# Patient Record
Sex: FEMALE | Race: WHITE | NOT HISPANIC OR LATINO | Employment: OTHER | URBAN - METROPOLITAN AREA
[De-identification: names, ages, dates, MRNs, and addresses within clinical notes are randomized per-mention and may not be internally consistent; named-entity substitution may affect disease eponyms.]

---

## 2018-02-23 ENCOUNTER — OFFICE VISIT CONVERTED (OUTPATIENT)
Dept: ORTHOPEDIC SURGERY | Facility: CLINIC | Age: 73
End: 2018-02-23
Attending: PHYSICIAN ASSISTANT

## 2018-03-16 ENCOUNTER — OFFICE VISIT CONVERTED (OUTPATIENT)
Dept: ORTHOPEDIC SURGERY | Facility: CLINIC | Age: 73
End: 2018-03-16
Attending: PHYSICIAN ASSISTANT

## 2018-04-02 ENCOUNTER — OFFICE VISIT CONVERTED (OUTPATIENT)
Dept: ORTHOPEDIC SURGERY | Facility: CLINIC | Age: 73
End: 2018-04-02
Attending: PHYSICIAN ASSISTANT

## 2018-04-30 ENCOUNTER — OFFICE VISIT CONVERTED (OUTPATIENT)
Dept: ORTHOPEDIC SURGERY | Facility: CLINIC | Age: 73
End: 2018-04-30
Attending: PHYSICIAN ASSISTANT

## 2018-06-12 ENCOUNTER — OFFICE VISIT CONVERTED (OUTPATIENT)
Dept: ORTHOPEDIC SURGERY | Facility: CLINIC | Age: 73
End: 2018-06-12
Attending: ORTHOPAEDIC SURGERY

## 2018-07-31 ENCOUNTER — OFFICE VISIT CONVERTED (OUTPATIENT)
Dept: ORTHOPEDIC SURGERY | Facility: CLINIC | Age: 73
End: 2018-07-31
Attending: ORTHOPAEDIC SURGERY

## 2019-05-31 ENCOUNTER — HOSPITAL ENCOUNTER (OUTPATIENT)
Dept: GENERAL RADIOLOGY | Facility: HOSPITAL | Age: 74
Discharge: HOME | End: 2019-05-31

## 2019-07-30 ENCOUNTER — HOSPITAL ENCOUNTER (OUTPATIENT)
Dept: OTHER | Facility: HOSPITAL | Age: 74
Setting detail: RECURRING SERIES
Discharge: HOME | End: 2019-09-26

## 2020-02-14 ENCOUNTER — OFFICE VISIT CONVERTED (OUTPATIENT)
Dept: SURGERY | Facility: CLINIC | Age: 75
End: 2020-02-14
Attending: SURGERY

## 2020-02-14 ENCOUNTER — CONVERSION ENCOUNTER (OUTPATIENT)
Dept: SURGERY | Facility: CLINIC | Age: 75
End: 2020-02-14

## 2020-03-10 ENCOUNTER — HOSPITAL ENCOUNTER (OUTPATIENT)
Dept: GENERAL RADIOLOGY | Facility: HOSPITAL | Age: 75
Discharge: HOME | End: 2020-03-10
Attending: INTERNAL MEDICINE

## 2020-03-18 ENCOUNTER — HOSPITAL ENCOUNTER (OUTPATIENT)
Dept: GASTROENTEROLOGY | Facility: HOSPITAL | Age: 75
Setting detail: HOSPITAL OUTPATIENT SURGERY
Discharge: HOME | End: 2020-03-18
Attending: SURGERY

## 2020-07-06 ENCOUNTER — HOSPITAL ENCOUNTER (OUTPATIENT)
Dept: GENERAL RADIOLOGY | Facility: HOSPITAL | Age: 75
Discharge: HOME | End: 2020-07-06
Attending: INTERNAL MEDICINE

## 2020-07-06 LAB
CREAT BLD-MCNC: 0.7 MG/DL (ref 0.6–1.4)
GFR SERPLBLD BASED ON 1.73 SQ M-ARVRAT: >60 ML/MIN/{1.73_M2}

## 2021-02-02 ENCOUNTER — HOSPITAL ENCOUNTER (OUTPATIENT)
Dept: URGENT CARE | Facility: CLINIC | Age: 76
Discharge: HOME | End: 2021-02-02
Attending: NURSE PRACTITIONER

## 2021-03-16 ENCOUNTER — HOSPITAL ENCOUNTER (OUTPATIENT)
Dept: VACCINE CLINIC | Facility: HOSPITAL | Age: 76
Discharge: HOME | End: 2021-03-16
Attending: INTERNAL MEDICINE

## 2021-04-06 ENCOUNTER — HOSPITAL ENCOUNTER (OUTPATIENT)
Dept: VACCINE CLINIC | Facility: HOSPITAL | Age: 76
Discharge: HOME | End: 2021-04-06
Attending: INTERNAL MEDICINE

## 2021-05-15 VITALS — BODY MASS INDEX: 20.61 KG/M2 | HEIGHT: 68 IN | RESPIRATION RATE: 16 BRPM | WEIGHT: 136 LBS

## 2021-05-16 VITALS — RESPIRATION RATE: 16 BRPM | BODY MASS INDEX: 22.73 KG/M2 | HEIGHT: 68 IN | WEIGHT: 150 LBS

## 2021-05-16 VITALS — WEIGHT: 150 LBS | RESPIRATION RATE: 16 BRPM | BODY MASS INDEX: 22.73 KG/M2 | HEIGHT: 68 IN

## 2021-05-16 VITALS — WEIGHT: 150 LBS | BODY MASS INDEX: 22.73 KG/M2 | HEIGHT: 68 IN | RESPIRATION RATE: 18 BRPM

## 2021-05-16 VITALS — HEIGHT: 68 IN | RESPIRATION RATE: 16 BRPM | WEIGHT: 150 LBS | BODY MASS INDEX: 22.73 KG/M2

## 2021-05-22 ENCOUNTER — TRANSCRIBE ORDERS (OUTPATIENT)
Dept: ADMINISTRATIVE | Facility: HOSPITAL | Age: 76
End: 2021-05-22

## 2021-05-22 DIAGNOSIS — Z12.31 SCREENING MAMMOGRAM, ENCOUNTER FOR: Primary | ICD-10-CM

## 2021-07-29 ENCOUNTER — TREATMENT (OUTPATIENT)
Dept: PHYSICAL THERAPY | Facility: CLINIC | Age: 76
End: 2021-07-29

## 2021-07-29 ENCOUNTER — TRANSCRIBE ORDERS (OUTPATIENT)
Dept: PHYSICAL THERAPY | Facility: CLINIC | Age: 76
End: 2021-07-29

## 2021-07-29 DIAGNOSIS — G89.29 CHRONIC BILATERAL LOW BACK PAIN WITH BILATERAL SCIATICA: Primary | ICD-10-CM

## 2021-07-29 DIAGNOSIS — R26.2 DIFFICULTY WALKING: ICD-10-CM

## 2021-07-29 DIAGNOSIS — M54.42 CHRONIC BILATERAL LOW BACK PAIN WITH BILATERAL SCIATICA: Primary | ICD-10-CM

## 2021-07-29 DIAGNOSIS — M54.50 LOW BACK PAIN, UNSPECIFIED BACK PAIN LATERALITY, UNSPECIFIED CHRONICITY, UNSPECIFIED WHETHER SCIATICA PRESENT: Primary | ICD-10-CM

## 2021-07-29 DIAGNOSIS — M54.41 CHRONIC BILATERAL LOW BACK PAIN WITH BILATERAL SCIATICA: Primary | ICD-10-CM

## 2021-07-29 DIAGNOSIS — R29.3 POOR POSTURE: ICD-10-CM

## 2021-07-29 PROCEDURE — 97161 PT EVAL LOW COMPLEX 20 MIN: CPT | Performed by: PHYSICAL THERAPIST

## 2021-07-29 PROCEDURE — 97110 THERAPEUTIC EXERCISES: CPT | Performed by: PHYSICAL THERAPIST

## 2021-07-29 NOTE — PROGRESS NOTES
Physical Therapy Initial Evaluation and Plan of Care        Patient: Maegan RAMIREZ Post   : 1945  Diagnosis/ICD-10 Code:  No primary diagnosis found.  Referring practitioner: Evert Villarreal MD  Date of Initial Visit: 2021  Today's Date: 2021  Patient seen for Visit count could not be calculated. Make sure you are using a visit which is associated with an episode. sessions           Subjective Questionnaire: Oswestry:       Subjective Evaluation    History of Present Illness  Mechanism of injury: Patient reports that she has had lower back pain back for several years with no VIKASH.  Patient reports that it has gradually gotten worse. Pt states that she has bilateral LE aching pain that occurs during walking and standing activities.  Patient reports that she is walking about a mile but would like to walk 2 miles. Patient states that sitting decreases her pain.   Patient is taking flexoril and tylenol for pain control.     Pain  Current pain ratin  At best pain ratin  At worst pain rating: 10  Quality: dull ache  Aggravating factors: ambulation, lifting, prolonged positioning, standing and movement  Progression: worsening    Treatments  Previous treatment: physical therapy  Patient Goals  Patient goals for therapy: decreased pain, improved balance, increased motion, increased strength, independence with ADLs/IADLs, return to sport/leisure activities and return to work             Objective          Palpation   Left   Tenderness of the erector spinae.     Right Tenderness of the erector spinae.     Tenderness     Lumbar Spine  Tenderness in the spinous process.     Neurological Testing     Sensation     Hip   Left Hip   Intact: light touch    Right Hip   Intact: light touch    Knee   Left Knee   Intact: light touch    Right Knee   Intact: light touch     Ankle/Foot   Left Ankle/Foot   Intact: light touch    Right Ankle/Foot   Intact: light touch     Active Range of Motion     Additional Active  Range of Motion Details  Lumbar AROM: (all motions painful)  Flexion: 50% limited  Extension: 75% limited  Rotation: 50% limited bilaterally  Lateral flexion: 50% limited bilaterally    Strength/Myotome Testing     Left Hip   Planes of Motion   Flexion: 4-  Extension: 4-  Abduction: 4-    Right Hip   Planes of Motion   Flexion: 4-  Extension: 4-  Abduction: 4-    Left Knee   Flexion: 4  Extension: 4    Right Knee   Flexion: 4  Extension: 4    Left Ankle/Foot   Dorsiflexion: 4+    Right Ankle/Foot   Dorsiflexion: 4+    Tests     Lumbar     Left   Positive quadrant.   Negative passive SLR.     Right   Positive quadrant.   Negative passive SLR.     Left Pelvic Girdle/Sacrum   Negative: sacral spring.     Right Pelvic Girdle/Sacrum   Negative: sacral spring.     Additional Tests Details  Lumbar distraction: positive for pain relief     General Comments     Lumbar Comments  Bilateral hamstring tightness noted         Assessment & Plan     Assessment  Impairments: abnormal gait, abnormal muscle firing, abnormal or restricted ROM, activity intolerance, impaired balance, impaired physical strength, lacks appropriate home exercise program, pain with function, safety issue and weight-bearing intolerance  Assessment details: Patient presents with signs/symptoms consistent with lower back pain with bilateral LE radiculopathy including decreased lumbar ROM, bilateral hip and core weakness and reports of pain limiting function during ADLs as shown on the KAL. Patient would benefit from skilled PT services in order to address remaining deficits limiting function at this time.    Prognosis: good  Functional Limitations: walking, uncomfortable because of pain, moving in bed, standing and stooping  Goals  Plan Goals: 1. The patient complains of low back pain.  LTG 1: 12 weeks:  The patient will report a pain rating of 2/10 or better in order to improve  tolerance to activities of daily living and improve sleep quality.  STATUS:   New  STG 1a: 6 weeks:  The patient will report a pain rating of 3/10 or better.  STATUS:  New  TREATMENT:  Therapeutic exercises, manual therapy, aquatic therapy, home exercise   instruction, and modalities as needed for pain to include:  electrical stimulation, moist heat, ice,   ultrasound, and diathermy.      2. The patient demonstrates weakness of the bilateral hip.  LTG 2: 12 weeks:  The patient will demonstrate 4+ /5 strength for bilateral hip flexion, abduction,  and extension in order to improve hip stability.  STATUS:  New  STG 2a: 6 weeks:  The patient will demonstrate 4 /5 strength for bilateral hip flexion, abduction,  and extension.  STATUS:  New  TREATMENT: Therapeutic exercises, manual therapy, aquatic therapy, home exercise instruction,  and modalities as needed for pain to include:  electrical stimulation, moist heat, ice, ultrasound, and   diathermy.      3. The patient has gait dysfunction.  LTG 3: 12 weeks:  The patient will ambulate without assistive device, independently, for   community distances with minimal limp in order to improve mobility and allow   patient to perform activities such as grocery shopping with greater ease.  STATUS:  New  TREATMENT: Gait training, aquatic therapy, therapeutic exercise, and home exercise instruction.    4. Mobility: Walking/Moving Around Functional Limitation    LTG 4: 12 weeks:  The patient will demonstrate 1-19 % limitation by achieving a score of 2/45 on the KAL.  STATUS:  New  TREATMENT:  Manual therapy, therapeutic exercise, home exercise instruction, and modalities as needed to include: moist heat, electrical stimulation, and ultrasound.    Plan  Therapy options: will be seen for skilled physical therapy services  Planned modality interventions: cryotherapy, electrical stimulation/Russian stimulation and TENS  Planned therapy interventions: balance/weight-bearing training, ADL retraining, soft tissue mobilization, strengthening, stretching, therapeutic  activities, joint mobilization, home exercise program, gait training, functional ROM exercises, flexibility, body mechanics training, postural training, neuromuscular re-education, manual therapy and spinal/joint mobilization  Frequency: 2x week  Duration in weeks: 12  Treatment plan discussed with: patient        Timed:         Manual Therapy:    0     mins  77102;     Therapeutic Exercise:    10     mins  41605;     Neuromuscular Farhan:    0    mins  05826;    Therapeutic Activity:     0     mins  44810;     Gait Trainin     mins  45085;     Ultrasound:     0     mins  29748;    Ionto                               0    mins   73801  Self-care  __0__ mins 45702    Un-Timed:  Electrical Stimulation:    0     mins  02258 ( );  Traction     0     mins 31201  Low Eval     35     Mins  81870  Mod Eval     0     Mins  42745  High Eval                       0     Mins  97167    Timed Treatment:   10   mins   Total Treatment:     45   mins    PT SIGNATURE: Zhanna Gutiérrez PT   DATE TREATMENT INITIATED: 2021    Initial Certification  Certification Period: 10/27/2021  I certify that the therapy services are furnished while this patient is under my care.  The services outlined above are required by this patient, and will be reviewed every 90 days.     PHYSICIAN: Evert Villarreal MD      DATE:     Please sign and return via fax to [phone].. Thank you, [facility].

## 2021-08-02 ENCOUNTER — TREATMENT (OUTPATIENT)
Dept: PHYSICAL THERAPY | Facility: CLINIC | Age: 76
End: 2021-08-02

## 2021-08-02 DIAGNOSIS — M54.42 CHRONIC BILATERAL LOW BACK PAIN WITH BILATERAL SCIATICA: Primary | ICD-10-CM

## 2021-08-02 DIAGNOSIS — M54.41 CHRONIC BILATERAL LOW BACK PAIN WITH BILATERAL SCIATICA: Primary | ICD-10-CM

## 2021-08-02 DIAGNOSIS — R29.3 POOR POSTURE: ICD-10-CM

## 2021-08-02 DIAGNOSIS — G89.29 CHRONIC BILATERAL LOW BACK PAIN WITH BILATERAL SCIATICA: Primary | ICD-10-CM

## 2021-08-02 DIAGNOSIS — R26.2 DIFFICULTY WALKING: ICD-10-CM

## 2021-08-02 PROCEDURE — 97140 MANUAL THERAPY 1/> REGIONS: CPT | Performed by: PHYSICAL THERAPIST

## 2021-08-02 PROCEDURE — 97110 THERAPEUTIC EXERCISES: CPT | Performed by: PHYSICAL THERAPIST

## 2021-08-02 NOTE — PROGRESS NOTES
Physical Therapy Daily Progress Note        Patient: Maegan RAMIREZ Post   : 1945  Diagnosis/ICD-10 Code:  Chronic bilateral low back pain with bilateral sciatica [M54.42, M54.41, G89.29]  Referring practitioner: Evert Villarreal MD  Date of Initial Visit: Type: THERAPY  Noted: 2021  Today's Date: 2021  Patient seen for 2 sessions             Subjective   Maegan Post reports: back really bothering her today, states depending on the activity both legs might hurt or just one leg.     Back Pain: 10/10 walking   Back Pain: better when sitting, never 0/10 pain.     Objective   No complaints of increased pain or discomfort.     See Exercise, Manual, and Modality Logs for complete treatment.       Assessment/Plan  Maegan still experiencing increased B hip pain, especially with increased walking. Pt tolerated exercises well, no complaints of increased pain or discomfort. Pt would benefit from skilled PT to address Range of Motion  and Strength deficits, pain management and any concerns with ADLs.     Progress per Plan of Care           Timed:  Manual Therapy:    15     mins  98443;  Therapeutic Exercise:    15     mins  50732;     Neuromuscular Farhan:        mins  44493;    Therapeutic Activity:          mins  36882;     Gait Training:           mins  06093;    Aquatic Therapy:          mins  09696;       Untimed:  Electrical Stimulation:         mins  45484 ( );  Mechanical Traction:         mins  53932;       Timed Treatment:   30   mins   Total Treatment:     30   mins        Ena Turpin PTA  Physical Therapist Assistant

## 2021-08-04 ENCOUNTER — TREATMENT (OUTPATIENT)
Dept: PHYSICAL THERAPY | Facility: CLINIC | Age: 76
End: 2021-08-04

## 2021-08-04 DIAGNOSIS — R26.2 DIFFICULTY WALKING: ICD-10-CM

## 2021-08-04 DIAGNOSIS — M54.41 CHRONIC BILATERAL LOW BACK PAIN WITH BILATERAL SCIATICA: Primary | ICD-10-CM

## 2021-08-04 DIAGNOSIS — M54.42 CHRONIC BILATERAL LOW BACK PAIN WITH BILATERAL SCIATICA: Primary | ICD-10-CM

## 2021-08-04 DIAGNOSIS — R29.3 POOR POSTURE: ICD-10-CM

## 2021-08-04 DIAGNOSIS — G89.29 CHRONIC BILATERAL LOW BACK PAIN WITH BILATERAL SCIATICA: Primary | ICD-10-CM

## 2021-08-04 PROCEDURE — 97140 MANUAL THERAPY 1/> REGIONS: CPT | Performed by: PHYSICAL THERAPIST

## 2021-08-04 PROCEDURE — 97110 THERAPEUTIC EXERCISES: CPT | Performed by: PHYSICAL THERAPIST

## 2021-08-04 NOTE — PROGRESS NOTES
Physical Therapy Daily Progress Note        Patient: Maegan Madera   : 1945  Diagnosis/ICD-10 Code:  Chronic bilateral low back pain with bilateral sciatica [M54.42, M54.41, G89.29]  Referring practitioner: Evert Villarreal MD  Date of Initial Visit: Type: THERAPY  Noted: 2021  Today's Date: 2021  Patient seen for 3 sessions             Subjective   Maegan Post reports: no pain currently but states that she had significantly increased lower back pain while walking this morning.     Objective   PPT activation MMT: 4-/5  See Exercise, Manual, and Modality Logs for complete treatment.       Assessment/Plan  Patient tolerated all exercise progressions and manual therapy well today but continues to demonstrate strength/ROM deficits limiting function. Continue to progress per patient tolerance.    Progress per Plan of Care           Timed:  Manual Therapy:    20     mins  32800;  Therapeutic Exercise:    15     mins  26194;     Neuromuscular Farhan:    0    mins  41684;    Therapeutic Activity:     0     mins  79002;     Gait Trainin     mins  38338;     Ultrasound:     0     mins  41557;    Electrical Stimulation:    0     mins  79775;  Iontophoresis     0     mins  72277    Untimed:  Electrical Stimulation:    0     mins  45173 ( );  Mechanical Traction:    0     mins  94891;   Fluidotherapy     0     mins  76803    Timed Treatment:   35   mins   Total Treatment:     35   mins        Zhanna Gutiérrez PT  Physical Therapist

## 2021-08-11 ENCOUNTER — TREATMENT (OUTPATIENT)
Dept: PHYSICAL THERAPY | Facility: CLINIC | Age: 76
End: 2021-08-11

## 2021-08-11 DIAGNOSIS — M54.42 CHRONIC BILATERAL LOW BACK PAIN WITH BILATERAL SCIATICA: Primary | ICD-10-CM

## 2021-08-11 DIAGNOSIS — G89.29 CHRONIC BILATERAL LOW BACK PAIN WITH BILATERAL SCIATICA: Primary | ICD-10-CM

## 2021-08-11 DIAGNOSIS — R26.2 DIFFICULTY WALKING: ICD-10-CM

## 2021-08-11 DIAGNOSIS — M54.41 CHRONIC BILATERAL LOW BACK PAIN WITH BILATERAL SCIATICA: Primary | ICD-10-CM

## 2021-08-11 DIAGNOSIS — R29.3 POOR POSTURE: ICD-10-CM

## 2021-08-11 PROCEDURE — 97140 MANUAL THERAPY 1/> REGIONS: CPT | Performed by: PHYSICAL THERAPIST

## 2021-08-11 PROCEDURE — 97110 THERAPEUTIC EXERCISES: CPT | Performed by: PHYSICAL THERAPIST

## 2021-08-11 NOTE — PROGRESS NOTES
"Physical Therapy Daily Progress Note        Patient: Maegan Madera   : 1945  Diagnosis/ICD-10 Code:  Chronic bilateral low back pain with bilateral sciatica [M54.42, M54.41, G89.29]  Referring practitioner: Evert Villarreal MD  Date of Initial Visit: Type: THERAPY  Noted: 2021  Today's Date: 2021  Patient seen for 4 sessions             Subjective   Maegan Madera reports having same 10/10 pain in her lower back when standing and walking.  She reports that she walks her dogs 1 mile every morning and states, \"I have to stop and rest because the pain gets too bad\".    She reports getting temporary reduction in pain after therapy, but states that her pain returns once she begins doing functional activities around the house.    Objective     Bilateral Hip Strength - Grossly 4-/5        See Exercise and Manual Logs for complete treatment.       Assessment/Plan  Pt tolerated therapy session well - with progression of therapeutic exercises, CKC-Functional activities, and Manual therapy. She has improved, but continues to have deficits in Trunk and Bilateral Lower Extremtiy ROM,  Strength, and Stability; limiting function and ability to perform ADLs at this time.  Pt reported decrease in pain and tightness post session today.    Progress per Plan of Care           Timed:  Manual Therapy:    13     mins  71443;  Therapeutic Exercise:    25     mins  22557;     Neuromuscular Farhan:    0    mins  65617;    Therapeutic Activity:     0     mins  65079;     Gait Trainin     mins  17451;     Ultrasound:     0     mins  59662;    Electrical Stimulation:    0     mins  33158;  Iontophoresis     0     mins  31032    Untimed:  Electrical Stimulation:    0     mins  49937 ( );  Mechanical Traction:    0     mins  22107;   Fluidotherapy     0     mins  42600  Hot pack     0     mins  06281  Cold pack     0     mins  93772    Timed Treatment:   38   mins   Total Treatment:     38   mins        Babs Davis" PTA  Physical Therapist Assistant

## 2021-08-13 ENCOUNTER — TREATMENT (OUTPATIENT)
Dept: PHYSICAL THERAPY | Facility: CLINIC | Age: 76
End: 2021-08-13

## 2021-08-13 DIAGNOSIS — R26.2 DIFFICULTY WALKING: ICD-10-CM

## 2021-08-13 DIAGNOSIS — M54.42 CHRONIC BILATERAL LOW BACK PAIN WITH BILATERAL SCIATICA: Primary | ICD-10-CM

## 2021-08-13 DIAGNOSIS — M54.41 CHRONIC BILATERAL LOW BACK PAIN WITH BILATERAL SCIATICA: Primary | ICD-10-CM

## 2021-08-13 DIAGNOSIS — G89.29 CHRONIC BILATERAL LOW BACK PAIN WITH BILATERAL SCIATICA: Primary | ICD-10-CM

## 2021-08-13 DIAGNOSIS — R29.3 POOR POSTURE: ICD-10-CM

## 2021-08-13 PROCEDURE — 97140 MANUAL THERAPY 1/> REGIONS: CPT | Performed by: PHYSICAL THERAPIST

## 2021-08-13 PROCEDURE — 97110 THERAPEUTIC EXERCISES: CPT | Performed by: PHYSICAL THERAPIST

## 2021-08-13 NOTE — PROGRESS NOTES
Physical Therapy Daily Progress Note        Patient: Maegan Madera   : 1945  Diagnosis/ICD-10 Code:  Chronic bilateral low back pain with bilateral sciatica [M54.42, M54.41, G89.29]  Referring practitioner: Evert Villarreal MD  Date of Initial Visit: Type: THERAPY  Noted: 2021  Today's Date: 2021  Patient seen for 5 sessions             Subjective   Maegan Madera reports that her back seems to be a little bit better- rating her pain at 7-8/10 upon arrival today.    Objective     Active Range of Motion     Additional Active Range of Motion Details  Lumbar AROM:   Flexion: 25% limited  Extension: 75% limited  Rotation: 50% limited bilaterally  Lateral flexion: 50% limited bilaterally (Pain bilaterally with sidebend L/R       Leg Length - in standing right Lower Extremity Strength appears shorter - in supine Left Lower Extremity appears shorter       See Exercise and Manual Logs for complete treatment.       Assessment/Plan     Pt tolerated therapy session well - with progression of therapeutic exercises, CKC-Functional activities, and Manual therapy. She has improved, but continues to have deficits in Trunk and Bilateral Lower Extremtiy ROM,  Strength, and Stability; limiting function and ability to perform ADLs at this time.  Pt reported decrease in pain and tightness post session today.  Trial of 1/2 inch heel lift in right/Left shoe.  Pt plans to trial at home this weekend with other shoes.    Progress per Plan of Care           Timed:  Manual Therapy:    13     mins  26781;  Therapeutic Exercise:    25     mins  09817;     Neuromuscular Farhan:    0    mins  05678;    Therapeutic Activity:     0     mins  53079;     Gait Trainin     mins  57415;     Ultrasound:     0     mins  66941;    Electrical Stimulation:    0     mins  66105;  Iontophoresis     0     mins  32622    Untimed:  Electrical Stimulation:    0     mins  71184 ( );  Mechanical Traction:    0     mins  14757;    Fluidotherapy     0     mins  20749  Hot pack     0     mins  99279  Cold pack     0     mins  39078    Timed Treatment:   38   mins   Total Treatment:     38   mins        Babs Davis PTA  Physical Therapist Assistant

## 2021-08-17 ENCOUNTER — TREATMENT (OUTPATIENT)
Dept: PHYSICAL THERAPY | Facility: CLINIC | Age: 76
End: 2021-08-17

## 2021-08-17 DIAGNOSIS — M54.41 CHRONIC BILATERAL LOW BACK PAIN WITH BILATERAL SCIATICA: Primary | ICD-10-CM

## 2021-08-17 DIAGNOSIS — R29.3 POOR POSTURE: ICD-10-CM

## 2021-08-17 DIAGNOSIS — G89.29 CHRONIC BILATERAL LOW BACK PAIN WITH BILATERAL SCIATICA: Primary | ICD-10-CM

## 2021-08-17 DIAGNOSIS — M54.42 CHRONIC BILATERAL LOW BACK PAIN WITH BILATERAL SCIATICA: Primary | ICD-10-CM

## 2021-08-17 DIAGNOSIS — R26.2 DIFFICULTY WALKING: ICD-10-CM

## 2021-08-17 PROCEDURE — 97140 MANUAL THERAPY 1/> REGIONS: CPT | Performed by: PHYSICAL THERAPIST

## 2021-08-17 PROCEDURE — 97110 THERAPEUTIC EXERCISES: CPT | Performed by: PHYSICAL THERAPIST

## 2021-08-17 NOTE — PROGRESS NOTES
Physical Therapy Daily Progress Note        Patient: Maegan Madera   : 1945  Diagnosis/ICD-10 Code:  Chronic bilateral low back pain with bilateral sciatica [M54.42, M54.41, G89.29]  Referring practitioner: Evert Villarreal MD  Date of Initial Visit: Type: THERAPY  Noted: 2021  Today's Date: 2021  Patient seen for 6 sessions             Subjective   Maegan Santy reports: 7/10 lower back pain at beginning of session today.    Objective   Bilateral hip abduction MMT; 4-/5  See Exercise, Manual, and Modality Logs for complete treatment.       Assessment/Plan  Patient tolerated all exercise progressions and manual therapy well today but continues to demonstrate strength/ROM deficits limiting function. Continue to progress per patient tolerance.    Progress per Plan of Care           Timed:  Manual Therapy:    15     mins  26780;  Therapeutic Exercise:    15     mins  63079;     Neuromuscular Farhan:    0    mins  13288;    Therapeutic Activity:     0     mins  23475;     Gait Trainin     mins  00066;     Ultrasound:     0     mins  55316;    Electrical Stimulation:    0     mins  23472;  Iontophoresis     0     mins  50726    Untimed:  Electrical Stimulation:    0     mins  89220 ( );  Mechanical Traction:    0     mins  07554;   Fluidotherapy     0     mins  78940  Hot pack     0     Mins    Cold pack                       0     Mins     Timed Treatment:   30   mins   Total Treatment:     30   mins        Zhanna Gutiérrez PT  Physical Therapist

## 2021-08-19 ENCOUNTER — TREATMENT (OUTPATIENT)
Dept: PHYSICAL THERAPY | Facility: CLINIC | Age: 76
End: 2021-08-19

## 2021-08-19 DIAGNOSIS — G89.29 CHRONIC BILATERAL LOW BACK PAIN WITH BILATERAL SCIATICA: Primary | ICD-10-CM

## 2021-08-19 DIAGNOSIS — M54.42 CHRONIC BILATERAL LOW BACK PAIN WITH BILATERAL SCIATICA: Primary | ICD-10-CM

## 2021-08-19 DIAGNOSIS — R26.2 DIFFICULTY WALKING: ICD-10-CM

## 2021-08-19 DIAGNOSIS — R29.3 POOR POSTURE: ICD-10-CM

## 2021-08-19 DIAGNOSIS — M54.41 CHRONIC BILATERAL LOW BACK PAIN WITH BILATERAL SCIATICA: Primary | ICD-10-CM

## 2021-08-19 PROCEDURE — 97110 THERAPEUTIC EXERCISES: CPT | Performed by: PHYSICAL THERAPIST

## 2021-08-19 PROCEDURE — 97140 MANUAL THERAPY 1/> REGIONS: CPT | Performed by: PHYSICAL THERAPIST

## 2021-08-19 NOTE — PROGRESS NOTES
Physical Therapy Daily Progress Note        Patient: Maegan Madera   : 1945  Diagnosis/ICD-10 Code:  Chronic bilateral low back pain with bilateral sciatica [M54.42, M54.41, G89.29]  Referring practitioner: Evert Villarreal MD  Date of Initial Visit: Type: THERAPY  Noted: 2021  Today's Date: 2021  Patient seen for 7 sessions             Subjective   Maegan Madera reports that her left hip and back have been feeling much better.  She rates her discomfort at 2-3/10 upon arrival today.  She continues to report trialing heel lifts in L/R shoe at home intermittently.    Objective     Lower Extremity Strength:  Bilateral Hip Abduction: 4-/5    See Exercise and Manual Logs for complete treatment.       Assessment/Plan     Pt tolerated therapy session well - with progression of therapeutic exercises, CKC-Functional activities, and Manual therapy. She has improved, but continues to have deficits in Trunk and Bilateral Lower Extremtiy ROM,  Strength, and Stability; limiting function and ability to perform ADLs at this time.  Pt reported decrease in pain and tightness post session after Manual therapy and Soft Tissue Mobilization today.    Progress per Plan of Care           Timed:  Manual Therapy:    15     mins  36378;  Therapeutic Exercise:    18     mins  20600;     Neuromuscular Farhan:    0    mins  25751;    Therapeutic Activity:     0     mins  35744;     Gait Trainin     mins  37072;     Ultrasound:     0     mins  05619;    Electrical Stimulation:    0     mins  26352;  Iontophoresis     0     mins  97913    Untimed:  Electrical Stimulation:    0     mins  78959 ( );  Mechanical Traction:    0     mins  28171;   Fluidotherapy     0     mins  43053  Hot pack     0     mins  85247  Cold pack     0     mins  68923    Timed Treatment:   33   mins   Total Treatment:     33   mins        Babs Davis PTA  Physical Therapist Assistant

## 2021-08-26 ENCOUNTER — TREATMENT (OUTPATIENT)
Dept: PHYSICAL THERAPY | Facility: CLINIC | Age: 76
End: 2021-08-26

## 2021-08-26 DIAGNOSIS — G89.29 CHRONIC BILATERAL LOW BACK PAIN WITH BILATERAL SCIATICA: Primary | ICD-10-CM

## 2021-08-26 DIAGNOSIS — R26.2 DIFFICULTY WALKING: ICD-10-CM

## 2021-08-26 DIAGNOSIS — R29.3 POOR POSTURE: ICD-10-CM

## 2021-08-26 DIAGNOSIS — M54.42 CHRONIC BILATERAL LOW BACK PAIN WITH BILATERAL SCIATICA: Primary | ICD-10-CM

## 2021-08-26 DIAGNOSIS — M54.41 CHRONIC BILATERAL LOW BACK PAIN WITH BILATERAL SCIATICA: Primary | ICD-10-CM

## 2021-08-26 PROCEDURE — 97110 THERAPEUTIC EXERCISES: CPT | Performed by: PHYSICAL THERAPIST

## 2021-08-26 PROCEDURE — 97140 MANUAL THERAPY 1/> REGIONS: CPT | Performed by: PHYSICAL THERAPIST

## 2021-08-26 NOTE — PROGRESS NOTES
"Physical Therapy Daily Progress Note        Patient: Maegan Madera   : 1945  Diagnosis/ICD-10 Code:  Chronic bilateral low back pain with bilateral sciatica [M54.42, M54.41, G89.29]  Referring practitioner: Evert Villarreal MD  Date of Initial Visit: Type: THERAPY  Noted: 2021  Today's Date: 2021  Patient seen for 8 sessions             Subjective   Maegan Madera reports that she has continued to feel better since beginning therapy- reporting decrease in pain with increase in functional mobility.  She  Rates her lower back pain at 2/10 upon arrival.  She does continue to report having \"Sciatica\" - stating, \"I have had that for years\".    Objective     Lower Extremity Strength:    Bilateral Hip Abduction:    Right:  4/5  Left:  4-/5        See Exercise and Manual Logs for complete treatment.       Assessment/Plan     Pt tolerated therapy session well - with progression of therapeutic exercises, CKC-Functional activities, and Manual therapy. She has improved, but continues to have deficits in Trunk and Bilateral Lower Extremtiy ROM,  Strength, and Stability; limiting function and ability to perform ADLs at this time.  Pt reports decrease in pain and tightness post sessions after Manual therapy and Soft Tissue Mobilization past few visits.    Progress per Plan of Care           Timed:  Manual Therapy:    13     mins  77214;  Therapeutic Exercise:    25     mins  16465;     Neuromuscular Farhan:    0    mins  50391;    Therapeutic Activity:     0     mins  98732;     Gait Trainin     mins  64829;     Ultrasound:     0     mins  11924;    Electrical Stimulation:    0     mins  15209;  Iontophoresis     0     mins  31178    Untimed:  Electrical Stimulation:    0     mins  46586 ( );  Mechanical Traction:    0     mins  02027;   Fluidotherapy     0     mins  12261  Hot pack     0     mins  03762  Cold pack     0     mins  33056    Timed Treatment:   38   mins   Total Treatment:     38   " cierra Davis PTA  Physical Therapist Assistant

## 2021-08-30 ENCOUNTER — TREATMENT (OUTPATIENT)
Dept: PHYSICAL THERAPY | Facility: CLINIC | Age: 76
End: 2021-08-30

## 2021-08-30 DIAGNOSIS — M54.41 CHRONIC BILATERAL LOW BACK PAIN WITH BILATERAL SCIATICA: Primary | ICD-10-CM

## 2021-08-30 DIAGNOSIS — R29.3 POOR POSTURE: ICD-10-CM

## 2021-08-30 DIAGNOSIS — G89.29 CHRONIC BILATERAL LOW BACK PAIN WITH BILATERAL SCIATICA: Primary | ICD-10-CM

## 2021-08-30 DIAGNOSIS — M54.42 CHRONIC BILATERAL LOW BACK PAIN WITH BILATERAL SCIATICA: Primary | ICD-10-CM

## 2021-08-30 DIAGNOSIS — R26.2 DIFFICULTY WALKING: ICD-10-CM

## 2021-08-30 PROCEDURE — 97140 MANUAL THERAPY 1/> REGIONS: CPT | Performed by: PHYSICAL THERAPIST

## 2021-08-30 PROCEDURE — 97110 THERAPEUTIC EXERCISES: CPT | Performed by: PHYSICAL THERAPIST

## 2021-08-30 NOTE — PROGRESS NOTES
Progress note    Patient: Maegan RAMIREZ Post   : 1945  Diagnosis/ICD-10 Code:  Chronic bilateral low back pain with bilateral sciatica [M54.42, M54.41, G89.29]  Referring practitioner: vEert Villarreal MD  Date of Initial Visit: Type: THERAPY  Noted: 2021  Today's Date: 2021  Patient seen for 9 sessions    Progress note due: 2021  Re-cert due: 2021      Subjective:   Maegan Madera reports: that lower back/ LLE pain has gotten significantly better since starting therapy. Patient reports that she can now walk further but is still having intermittent L LE radicular pain and L buttock/lumbar pain which she rates as a 5/10 at worst.  Subjective Questionnaire: LEFS:   Clinical Progress: improved  Home Program Compliance: Yes  Treatment has included: therapeutic exercise, manual therapy and moist heat    Subjective   Objective          Strength/Myotome Testing     Left Hip   Planes of Motion   Flexion: 4  Extension: 4-  Abduction: 4-    Right Hip   Planes of Motion   Flexion: 4  Extension: 4-  Abduction: 4-    Left Knee   Flexion: 4  Extension: 4    Right Knee   Flexion: 4  Extension: 4    Left Ankle/Foot   Dorsiflexion: 4+    Right Ankle/Foot   Dorsiflexion: 4+     General Comments     Lumbar Comments  Lumbar AROM: 25% limitation  All directions      Assessment & Plan     Assessment  Assessment details: Patient demonstrates improvements in lumbar AROM and reports of pain but still with lumbar ROM deficits, bilateral hip and core weakness and reports of pain limiting function as shown on the KAL. Patient would continue to benefit from skilled PT services in order to address remaining deficits limiting function at this time.     Goals  Plan Goals: 1. The patient complains of low back pain.  LTG 1: 12 weeks:  The patient will report a pain rating of 2/10 or better in order to improve  tolerance to activities of daily living and improve sleep quality.  STATUS:  Ongoing  STG 1a: 6 weeks:  The patient  will report a pain rating of 3/10 or better.  STATUS:  Ongoing  TREATMENT:  Therapeutic exercises, manual therapy, aquatic therapy, home exercise   instruction, and modalities as needed for pain to include:  electrical stimulation, moist heat, ice,   ultrasound, and diathermy.      2. The patient demonstrates weakness of the bilateral hip.  LTG 2: 12 weeks:  The patient will demonstrate 4+ /5 strength for bilateral hip flexion, abduction,  and extension in order to improve hip stability.  STATUS:  Ongoing  STG 2a: 6 weeks:  The patient will demonstrate 4 /5 strength for bilateral hip flexion, abduction,  and extension.  STATUS:  Ongoing  TREATMENT: Therapeutic exercises, manual therapy, aquatic therapy, home exercise instruction,  and modalities as needed for pain to include:  electrical stimulation, moist heat, ice, ultrasound, and   diathermy.      3. The patient has gait dysfunction.  LTG 3: 12 weeks:  The patient will ambulate without assistive device, independently, for   community distances with minimal limp in order to improve mobility and allow   patient to perform activities such as grocery shopping with greater ease.  STATUS:  Ongoing  TREATMENT: Gait training, aquatic therapy, therapeutic exercise, and home exercise instruction.    4. Mobility: Walking/Moving Around Functional Limitation                   LTG 4: 12 weeks:  The patient will demonstrate 1-19 % limitation by achieving a score of 2/45 on the KAL.  STATUS:  Ongoing  TREATMENT:  Manual therapy, therapeutic exercise, home exercise instruction, and modalities as needed to include: moist heat, electrical stimulation, and ultrasound.    Plan  Frequency: 2x week  Duration in weeks: 8      Progress toward previous goals: Partially Met        Recommendations: Continue as planned  Timeframe: 2 months  Prognosis to achieve goals: good    PT SIGNATURE: Zhanna Gutiérrez PT   DATE TREATMENT INITIATED: 8/30/2021  Certification Period: 8/30/2021 -  2021      Based upon review of the patient's progress and continued therapy plan, it is my medical opinion that Maegan Madera should continue physical therapy treatment at [facility]  [address] [place] [address] [address] [place] [id number] [phone].    Signature: __________________________________  Evert Villarreal MD    Timed:  Manual Therapy:    12     mins  60253;  Therapeutic Exercise:    18     mins  78462;     Neuromuscular Farhan:    0    mins  86115;    Therapeutic Activity:     0     mins  57106;     Gait Trainin     mins  52347;     Ultrasound:     0     mins  06298;    Electrical Stimulation:    0     mins  34377 ( );    Untimed:  Electrical Stimulation:    0     mins  92910 ( );  Mechanical Traction:    0     mins  04989;     Timed Treatment:   30   mins   Total Treatment:     35   mins

## 2021-09-02 ENCOUNTER — TREATMENT (OUTPATIENT)
Dept: PHYSICAL THERAPY | Facility: CLINIC | Age: 76
End: 2021-09-02

## 2021-09-02 DIAGNOSIS — R29.3 POOR POSTURE: ICD-10-CM

## 2021-09-02 DIAGNOSIS — M54.42 CHRONIC BILATERAL LOW BACK PAIN WITH BILATERAL SCIATICA: Primary | ICD-10-CM

## 2021-09-02 DIAGNOSIS — G89.29 CHRONIC BILATERAL LOW BACK PAIN WITH BILATERAL SCIATICA: Primary | ICD-10-CM

## 2021-09-02 DIAGNOSIS — R26.2 DIFFICULTY WALKING: ICD-10-CM

## 2021-09-02 DIAGNOSIS — M54.41 CHRONIC BILATERAL LOW BACK PAIN WITH BILATERAL SCIATICA: Primary | ICD-10-CM

## 2021-09-02 PROCEDURE — 97110 THERAPEUTIC EXERCISES: CPT | Performed by: PHYSICAL THERAPIST

## 2021-09-02 PROCEDURE — 97140 MANUAL THERAPY 1/> REGIONS: CPT | Performed by: PHYSICAL THERAPIST

## 2021-09-02 NOTE — PROGRESS NOTES
"Physical Therapy Daily Progress Note        Patient: Maegan Madera   : 1945  Diagnosis/ICD-10 Code:  Chronic bilateral low back pain with bilateral sciatica [M54.42, M54.41, G89.29]  Referring practitioner: Evert Villarreal MD  Date of Initial Visit: Type: THERAPY  Noted: 2021  Today's Date: 2021  Patient seen for 10 sessions             Subjective   Maegan Madera reports overall feeling better.  She denies having any pain, but does report increased discomfort if she stands or walks more than 5-10 min and states, \"It gets progressively worse - the longer I go\".    Objective     Strength/Myotome Testing      Left Hip   Planes of Motion   Flexion: 4/5  Extension: 4-/5  Abduction: 4-/5     Right Hip   Planes of Motion   Flexion: 4/5  Extension: 4-/5  Abduction: 4-/5      See Exercise, Manual, and Modality Logs for complete treatment.       Assessment/Plan    Pt tolerated therapy session well - with progression of therapeutic exercises, CKC-Functional activities, and Manual therapy. She has improved, but continues to have deficits in Trunk and Bilateral Lower Extremtiy ROM,  Strength, and Stability; limiting function and ability to perform ADLs at this time.  Pt reports decrease in pain and tightness post sessions after Manual therapy and Soft tissue mobilization.     Progress per Plan of Care           Timed:  Manual Therapy:    15     mins  32497;  Therapeutic Exercise:    23     mins  50923;     Neuromuscular Farhan:    0    mins  97145;    Therapeutic Activity:     0     mins  63989;     Gait Trainin     mins  98507;     Ultrasound:     0     mins  87771;    Electrical Stimulation:    0     mins  54857;  Iontophoresis     0     mins  70148    Untimed:  Electrical Stimulation:    0     mins  76121 ( );  Mechanical Traction:    0     mins  33244;   Fluidotherapy     0     mins  73599  Hot pack     0     mins  19692  Cold pack     0     mins  43325    Timed Treatment:   38   mins   Total " Treatment:     38   mins        Babs Davis PTA  Physical Therapist Assistant

## 2021-09-08 ENCOUNTER — TREATMENT (OUTPATIENT)
Dept: PHYSICAL THERAPY | Facility: CLINIC | Age: 76
End: 2021-09-08

## 2021-09-08 DIAGNOSIS — G89.29 CHRONIC BILATERAL LOW BACK PAIN WITH BILATERAL SCIATICA: Primary | ICD-10-CM

## 2021-09-08 DIAGNOSIS — M54.41 CHRONIC BILATERAL LOW BACK PAIN WITH BILATERAL SCIATICA: Primary | ICD-10-CM

## 2021-09-08 DIAGNOSIS — M54.42 CHRONIC BILATERAL LOW BACK PAIN WITH BILATERAL SCIATICA: Primary | ICD-10-CM

## 2021-09-08 DIAGNOSIS — R29.3 POOR POSTURE: ICD-10-CM

## 2021-09-08 DIAGNOSIS — R26.2 DIFFICULTY WALKING: ICD-10-CM

## 2021-09-08 PROCEDURE — 97110 THERAPEUTIC EXERCISES: CPT | Performed by: PHYSICAL THERAPIST

## 2021-09-08 PROCEDURE — 97140 MANUAL THERAPY 1/> REGIONS: CPT | Performed by: PHYSICAL THERAPIST

## 2021-09-08 NOTE — PROGRESS NOTES
Physical Therapy Daily Progress Note        Patient: Maegan Madera   : 1945  Diagnosis/ICD-10 Code:  Chronic bilateral low back pain with bilateral sciatica [M54.42, M54.41, G89.29]  Referring practitioner: Evert Villarreal MD  Date of Initial Visit: Type: THERAPY  Noted: 2021  Today's Date: 2021  Patient seen for 11 sessions             Subjective   Maegan Madera denies having any pain in lower back upon  Arrival today.  She states that she continues to feel better overall.  Objective     Bilateral Hip Abductor Strength:  Bilaterally 4-/5        See Exercise Logs for complete treatment.       Assessment/Plan   Pt tolerated therapy session well - with progression of therapeutic exercises, CKC-Functional activities, and Manual therapy. She has improved, but continues to have deficits in Trunk and Bilateral Lower Extremtiy ROM,  Strength, and Stability; limiting function and ability to perform ADLs at this time.  Pt reports decrease in pain and tightness post sessions after Manual therapy and Soft tissue mobilization.       Progress per Plan of Care           Timed:  Manual Therapy:    15     mins  18092;  Therapeutic Exercise:    23     mins  93017;     Neuromuscular Farhan:    0    mins  23673;    Therapeutic Activity:     0     mins  27823;     Gait Trainin     mins  68461;     Ultrasound:     0     mins  91788;    Electrical Stimulation:    0     mins  25699;  Iontophoresis     0     mins  93259    Untimed:  Electrical Stimulation:    0     mins  78642 ( );  Mechanical Traction:    0     mins  21199;   Fluidotherapy     0     mins  45646  Hot pack     0     mins  09121  Cold pack     0     mins  94586     Timed Treatment:   38   mins   Total Treatment:     38   mins        Babs Davis PTA  Physical Therapist Assistant

## 2021-09-14 ENCOUNTER — TREATMENT (OUTPATIENT)
Dept: PHYSICAL THERAPY | Facility: CLINIC | Age: 76
End: 2021-09-14

## 2021-09-14 DIAGNOSIS — R26.2 DIFFICULTY WALKING: ICD-10-CM

## 2021-09-14 DIAGNOSIS — M54.42 CHRONIC BILATERAL LOW BACK PAIN WITH BILATERAL SCIATICA: Primary | ICD-10-CM

## 2021-09-14 DIAGNOSIS — G89.29 CHRONIC BILATERAL LOW BACK PAIN WITH BILATERAL SCIATICA: Primary | ICD-10-CM

## 2021-09-14 DIAGNOSIS — R29.3 POOR POSTURE: ICD-10-CM

## 2021-09-14 DIAGNOSIS — M54.41 CHRONIC BILATERAL LOW BACK PAIN WITH BILATERAL SCIATICA: Primary | ICD-10-CM

## 2021-09-14 PROCEDURE — 97140 MANUAL THERAPY 1/> REGIONS: CPT | Performed by: PHYSICAL THERAPIST

## 2021-09-14 PROCEDURE — 97110 THERAPEUTIC EXERCISES: CPT | Performed by: PHYSICAL THERAPIST

## 2021-09-14 NOTE — PROGRESS NOTES
Physical Therapy Daily Progress Note        Patient: Maegan Madera   : 1945  Diagnosis/ICD-10 Code:  Chronic bilateral low back pain with bilateral sciatica [M54.42, M54.41, G89.29]  Referring practitioner: Evert Villarreal MD  Date of Initial Visit: Type: THERAPY  Noted: 2021  Today's Date: 2021  Patient seen for 12 sessions             Subjective   Maegan Madera reports: 2/10 lower back pain at beginning of session today.     Objective   Bilateral hip abduction: 4-/5  See Exercise, Manual, and Modality Logs for complete treatment.       Assessment/Plan  Patient tolerated all exercise progressions and manual therapy well today but continues to demonstrate strength/ROM deficits limiting function. Continue to progress per patient tolerance.    Progress per Plan of Care           Timed:  Manual Therapy:    15     mins  82101;  Therapeutic Exercise:    23     mins  65393;     Neuromuscular Farhan:    0    mins  01772;    Therapeutic Activity:     0     mins  33265;     Gait Trainin     mins  17425;     Ultrasound:     0     mins  39552;    Electrical Stimulation:    0     mins  76431;  Iontophoresis     0     mins  57421    Untimed:  Electrical Stimulation:    0     mins  87373 ( );  Mechanical Traction:    0     mins  66393;   Fluidotherapy     0     mins  87225    Timed Treatment:   38   mins   Total Treatment:     38   mins        Zhanna Gutiérrez PT  Physical Therapist

## 2021-09-17 ENCOUNTER — TREATMENT (OUTPATIENT)
Dept: PHYSICAL THERAPY | Facility: CLINIC | Age: 76
End: 2021-09-17

## 2021-09-17 DIAGNOSIS — R26.2 DIFFICULTY WALKING: ICD-10-CM

## 2021-09-17 DIAGNOSIS — M54.42 CHRONIC BILATERAL LOW BACK PAIN WITH BILATERAL SCIATICA: Primary | ICD-10-CM

## 2021-09-17 DIAGNOSIS — G89.29 CHRONIC BILATERAL LOW BACK PAIN WITH BILATERAL SCIATICA: Primary | ICD-10-CM

## 2021-09-17 DIAGNOSIS — R29.3 POOR POSTURE: ICD-10-CM

## 2021-09-17 DIAGNOSIS — M54.41 CHRONIC BILATERAL LOW BACK PAIN WITH BILATERAL SCIATICA: Primary | ICD-10-CM

## 2021-09-17 PROCEDURE — 97110 THERAPEUTIC EXERCISES: CPT | Performed by: PHYSICAL THERAPIST

## 2021-09-17 PROCEDURE — 97140 MANUAL THERAPY 1/> REGIONS: CPT | Performed by: PHYSICAL THERAPIST

## 2021-09-17 NOTE — PROGRESS NOTES
Physical Therapy Daily Progress Note        Patient: Maegan Madera   : 1945  Diagnosis/ICD-10 Code:  Chronic bilateral low back pain with bilateral sciatica [M54.42, M54.41, G89.29]  Referring practitioner: Evert Villarreal MD  Date of Initial Visit: Type: THERAPY  Noted: 2021  Today's Date: 2021  Patient seen for 13 sessions             Subjective   Maegan Madera reports: 2/10 lower back pain today.    Objective   Bilateral hip abduction MMT: 4-/5  See Exercise, Manual, and Modality Logs for complete treatment.       Assessment/Plan  Patient tolerated all exercise progressions and manual therapy well today but continues to demonstrate strength/ROM deficits limiting function. Continue to progress per patient tolerance.    Progress per Plan of Care           Timed:  Manual Therapy:    15     mins  22474;  Therapeutic Exercise:    20     mins  36627;     Neuromuscular Farhan:    0    mins  61876;    Therapeutic Activity:     0     mins  39349;     Gait Trainin     mins  55508;     Ultrasound:     0     mins  07564;    Electrical Stimulation:    0     mins  76791;  Iontophoresis     0     mins  36831    Untimed:  Electrical Stimulation:    0     mins  82402 ( );  Mechanical Traction:    0     mins  21020;   Fluidotherapy     0     mins  16992  Hot pack     0     Mins    Cold pack                       0     Mins     Timed Treatment:   35   mins   Total Treatment:     35   mins        Zhanna Gutiérrez PT  Physical Therapist

## 2021-09-21 ENCOUNTER — TREATMENT (OUTPATIENT)
Dept: PHYSICAL THERAPY | Facility: CLINIC | Age: 76
End: 2021-09-21

## 2021-09-21 DIAGNOSIS — R29.3 POOR POSTURE: ICD-10-CM

## 2021-09-21 DIAGNOSIS — R26.2 DIFFICULTY WALKING: ICD-10-CM

## 2021-09-21 DIAGNOSIS — M54.41 CHRONIC BILATERAL LOW BACK PAIN WITH BILATERAL SCIATICA: Primary | ICD-10-CM

## 2021-09-21 DIAGNOSIS — G89.29 CHRONIC BILATERAL LOW BACK PAIN WITH BILATERAL SCIATICA: Primary | ICD-10-CM

## 2021-09-21 DIAGNOSIS — M54.42 CHRONIC BILATERAL LOW BACK PAIN WITH BILATERAL SCIATICA: Primary | ICD-10-CM

## 2021-09-21 PROCEDURE — 97140 MANUAL THERAPY 1/> REGIONS: CPT | Performed by: PHYSICAL THERAPIST

## 2021-09-21 PROCEDURE — 97110 THERAPEUTIC EXERCISES: CPT | Performed by: PHYSICAL THERAPIST

## 2021-09-21 NOTE — PROGRESS NOTES
Discharge note    Patient: Maegan RAMIREZ Post   : 1945  Diagnosis/ICD-10 Code:  Chronic bilateral low back pain with bilateral sciatica [M54.42, M54.41, G89.29]  Referring practitioner: Evert Villarreal MD  Date of Initial Visit: Type: THERAPY  Noted: 2021  Today's Date: 2021  Patient seen for 14 sessions    Progress note due: 10/21/2021  Re-cert due: 2021      Subjective:   Maegan Post reports: 2/10 lower back pain at beginning of session today.  Patient reports that due to improvements in pain and function she would like to be discharged from skilled PT services and continue with HEP.   Subjective Questionnaire: Oswestry:   Clinical Progress: improved  Home Program Compliance: Yes  Treatment has included: therapeutic exercise and manual therapy    Subjective   Objective   Strength/Myotome Testing      Left Hip   Planes of Motion   Flexion: 4+  Extension: 4  Abduction: 4     Right Hip   Planes of Motion   Flexion: 4+  Extension: 4  Abduction: 4     Left Knee   Flexion: 4+  Extension: 4+     Right Knee   Flexion: 4+  Extension: 4+     Left Ankle/Foot   Dorsiflexion: 4+     Right Ankle/Foot   Dorsiflexion: 4+      General Comments      Lumbar Comments  Lumbar AROM: 25% limitation  All directions   Assessment & Plan     Assessment  Assessment details: Patient demonstrates improvements in overall pain and function during ADLs as well as bilateral hip and core strength improvements.  Due to pain and functional improvements the patient wishes to be discharged from skilled PT services at this time and will continue HEP. Pt will be discharged per her wishes.     Goals  Plan Goals: 1. The patient complains of low back pain.  LTG 1: 12 weeks:  The patient will report a pain rating of 2/10 or better in order to improve  tolerance to activities of daily living and improve sleep quality.  STATUS:  MET  STG 1a: 6 weeks:  The patient will report a pain rating of 3/10 or better.  STATUS:  MET  TREATMENT:   Therapeutic exercises, manual therapy, aquatic therapy, home exercise   instruction, and modalities as needed for pain to include:  electrical stimulation, moist heat, ice,   ultrasound, and diathermy.      2. The patient demonstrates weakness of the bilateral hip.  LTG 2: 12 weeks:  The patient will demonstrate 4+ /5 strength for bilateral hip flexion, abduction,  and extension in order to improve hip stability.  STATUS:  Partially met, continue  STG 2a: 6 weeks:  The patient will demonstrate 4 /5 strength for bilateral hip flexion, abduction,  and extension.  STATUS:  MET  TREATMENT: Therapeutic exercises, manual therapy, aquatic therapy, home exercise instruction,  and modalities as needed for pain to include:  electrical stimulation, moist heat, ice, ultrasound, and   diathermy.      3. The patient has gait dysfunction.  LTG 3: 12 weeks:  The patient will ambulate without assistive device, independently, for   community distances with minimal limp in order to improve mobility and allow   patient to perform activities such as grocery shopping with greater ease.  STATUS:  MET  TREATMENT: Gait training, aquatic therapy, therapeutic exercise, and home exercise instruction.    4. Mobility: Walking/Moving Around Functional Limitation                   LTG 4: 12 weeks:  The patient will demonstrate 1-19 % limitation by achieving a score of 2/45 on the KAL.  STATUS:  Not met, continue  TREATMENT:  Manual therapy, therapeutic exercise, home exercise instruction, and modalities as needed to include: moist heat, electrical stimulation, and ultrasound.      Progress toward previous goals: Partially Met        Recommendations: Discharge    PT SIGNATURE: Zhanna Gutiérrez, KEVIN   DATE TREATMENT INITIATED: 9/21/2021  Certification Period: 9/21/2021 - 12/20/2021      Based upon review of the patient's progress and continued therapy plan, it is my medical opinion that Maegan Madera should continue physical therapy treatment at [facility]  [facility]  [address]  [place] [address]  [phone].    Signature: __________________________________  Evert Villarreal MD    Timed:  Manual Therapy:    15     mins  57630;  Therapeutic Exercise:    15     mins  65846;     Neuromuscular Farhan:    0    mins  49380;    Therapeutic Activity:     0     mins  12875;     Gait Trainin     mins  05729;     Ultrasound:     0     mins  67818;    Electrical Stimulation:    0     mins  00393 ( );    Untimed:  Electrical Stimulation:    0     mins  37529 ( );  Mechanical Traction:    0     mins  64410;     Timed Treatment:   30   mins   Total Treatment:     35   mins

## 2021-09-22 ENCOUNTER — HOSPITAL ENCOUNTER (OUTPATIENT)
Dept: MAMMOGRAPHY | Facility: HOSPITAL | Age: 76
Discharge: HOME | End: 2021-09-22
Admitting: INTERNAL MEDICINE

## 2021-09-22 DIAGNOSIS — Z12.31 SCREENING MAMMOGRAM, ENCOUNTER FOR: ICD-10-CM

## 2021-09-22 PROCEDURE — 77067 SCR MAMMO BI INCL CAD: CPT

## 2021-09-22 PROCEDURE — 77063 BREAST TOMOSYNTHESIS BI: CPT

## 2021-11-12 ENCOUNTER — TRANSCRIBE ORDERS (OUTPATIENT)
Dept: PHYSICAL THERAPY | Facility: CLINIC | Age: 76
End: 2021-11-12

## 2021-11-12 DIAGNOSIS — M54.40 LOW BACK PAIN WITH SCIATICA, SCIATICA LATERALITY UNSPECIFIED, UNSPECIFIED BACK PAIN LATERALITY, UNSPECIFIED CHRONICITY: Primary | ICD-10-CM

## 2021-12-02 ENCOUNTER — TREATMENT (OUTPATIENT)
Dept: PHYSICAL THERAPY | Facility: CLINIC | Age: 76
End: 2021-12-02

## 2021-12-02 DIAGNOSIS — G89.29 CHRONIC BILATERAL LOW BACK PAIN WITH LEFT-SIDED SCIATICA: Primary | ICD-10-CM

## 2021-12-02 DIAGNOSIS — M25.552 LEFT HIP PAIN: ICD-10-CM

## 2021-12-02 DIAGNOSIS — R26.2 DIFFICULTY WALKING: ICD-10-CM

## 2021-12-02 DIAGNOSIS — M54.42 CHRONIC BILATERAL LOW BACK PAIN WITH LEFT-SIDED SCIATICA: Primary | ICD-10-CM

## 2021-12-02 PROCEDURE — 97110 THERAPEUTIC EXERCISES: CPT | Performed by: PHYSICAL THERAPIST

## 2021-12-02 PROCEDURE — 97161 PT EVAL LOW COMPLEX 20 MIN: CPT | Performed by: PHYSICAL THERAPIST

## 2021-12-02 NOTE — PROGRESS NOTES
Physical Therapy Initial Evaluation and Plan of Care    Patient: Maegan RAMIREZ Post   : 1945  Diagnosis/ICD-10 Code:  Chronic bilateral low back pain with left-sided sciatica [M54.42, G89.29]  Referring practitioner: Evert Villarreal MD  Date of Initial Visit: 2021  Today's Date: 2021  Patient seen for 1 sessions           Subjective Questionnaire: Oswestry: 15/45      Subjective Evaluation    History of Present Illness  Mechanism of injury: Patient reports that she has had intermittent lower back pain for several years with no VIKASH.  Patient reports that it has gradually gotten worse.  Pt reports that 4 weeks ago her sciatic nerve pain flared up with no VIKASH.  Pt reports that pain is only going down to her L knee.  Pt reports that prolonged walking, standing increases her pain. Patient is taking flexoril and tylenol for pain control.  Pt reports intermittent L LE buckling.     Pain  Current pain ratin  At best pain ratin  At worst pain rating: 10  Quality: dull ache and discomfort  Aggravating factors: ambulation and standing    Social Support  Lives in: multiple-level home    Treatments  Previous treatment: physical therapy  Patient Goals  Patient goals for therapy: decreased pain, increased motion, increased strength, independence with ADLs/IADLs and return to sport/leisure activities           Objective          Static Posture   General Observations  Shifted right.     Head  Forward.    Shoulders  Rounded.    Scapulae  Left protracted and right protracted.    Thoracic Spine  Hyperkyphosis.    Lumbar Spine   Decreased lordosis.     Palpation   Left   Tenderness of the erector spinae.     Right Tenderness of the erector spinae.     Tenderness     Additional Tenderness Details  Lumbar paraspinal tenderness and tightness noted, bilateral piriformis and hamstring tightness noted    Active Range of Motion     Additional Active Range of Motion Details  Lumbar AROM: all motions painful  Flexion: 25%  limited (pain)  Extension: 50% limited (pain)  Lateral flexion: 50% limited (pain)  Rotation: 50% limited (pain)    Strength/Myotome Testing     Left Hip   Planes of Motion   Flexion: 4-  Extension: 4-  Abduction: 4-    Right Hip   Planes of Motion   Flexion: 4  Extension: 4-  Abduction: 4-    Left Knee   Flexion: 4-  Extension: 4-  Quadriceps contraction: fair    Right Knee   Flexion: 4+  Extension: 4+    Left Ankle/Foot   Dorsiflexion: 4+    Right Ankle/Foot   Dorsiflexion: 4+    Tests     Lumbar     Left   Positive passive SLR and quadrant.     Right   Positive quadrant.   Negative passive SLR.     Left Hip   Positive DARIEN, piriformis and scour.   90/90 SLR: Positive.   SLR: Negative.     Ambulation     Observational Gait   Gait: antalgic   Decreased walking speed, left stance time and left step length.   Left foot contact pattern: foot flat          Assessment & Plan     Assessment  Impairments: abnormal gait, abnormal muscle firing, abnormal or restricted ROM, activity intolerance, impaired balance, impaired physical strength, lacks appropriate home exercise program, pain with function, safety issue and weight-bearing intolerance  Functional Limitations: sleeping, walking, uncomfortable because of pain, moving in bed, sitting, standing and stooping  Assessment details: Patient presents with signs/symptoms consistent with lower back pain with LLE referred pain and left hip pain including decreased lumbar/left hip ROM, bilateral hip and core weakness and reports of pain limiting function during ADLs as shown on the KAL. Patient would benefit from skilled PT services in order to address remaining deficits limiting function at this time.       Prognosis: good    Goals  Plan Goals: 1. The patient complains of low back/L hip pain.  LTG 1: 12 weeks:  The patient will report a pain rating of 2 or better in order to improve  tolerance to activities of daily living and improve sleep quality.  STATUS:  New  STG 1a: 6  weeks:  The patient will report a pain rating of 3 or better.  STATUS:  New  TREATMENT:  Therapeutic exercises, manual therapy, aquatic therapy, home exercise   instruction, and modalities as needed for pain to include:  electrical stimulation, moist heat, ice,   ultrasound, and diathermy.      2. The patient demonstrates weakness of the bilateral hips.  LTG 2: 12 weeks:  The patient will demonstrate 4+ /5 strength for bilateral hip flexion, abduction,  and extension in order to improve hip stability.  STATUS:  New  STG 2a: 6 weeks:  The patient will demonstrate 4 /5 strength for bilateral hip flexion, abduction,  and extension.  STATUS:  New  TREATMENT: Therapeutic exercises, manual therapy, aquatic therapy, home exercise instruction,  and modalities as needed for pain to include:  electrical stimulation, moist heat, ice, ultrasound, and   diathermy.      3. The patient has gait dysfunction.  LTG 3: 12 weeks:  The patient will ambulate without assistive device, independently, for   community distances with minimal limp to the L lower extremity in order to improve mobility and allow   patient to perform activities such as grocery shopping with greater ease.  STATUS:  New  TREATMENT: Gait training, aquatic therapy, therapeutic exercise, and home exercise instruction.    3. Mobility: Walking/Moving Around Functional Limitation    LTG 3: 12 weeks:  The patient will demonstrate 1-19 % limitation by achieving a score of 5/45 on the KAL.  STATUS:  New  TREATMENT:  Manual therapy, therapeutic exercise, home exercise instruction, and modalities as needed to include: moist heat, electrical stimulation, and ultrasound.      Plan  Therapy options: will be seen for skilled therapy services  Planned modality interventions: cryotherapy, electrical stimulation/Russian stimulation and TENS  Planned therapy interventions: balance/weight-bearing training, ADL retraining, soft tissue mobilization, strengthening, stretching,  therapeutic activities, joint mobilization, home exercise program, gait training, functional ROM exercises, flexibility, body mechanics training, postural training, neuromuscular re-education and manual therapy  Frequency: 2x week  Duration in weeks: 12  Treatment plan discussed with: patient        Timed:         Manual Therapy:    0     mins  54614;     Therapeutic Exercise:    10     mins  08219;     Neuromuscular Farhan:    0    mins  99360;    Therapeutic Activity:     0     mins  35090;     Gait Trainin     mins  96794;     Ultrasound:     0     mins  59950;    Ionto                               0    mins   15805  Self-care  __0__ mins 40186    Un-Timed:  Electrical Stimulation:    0     mins  24220 ( );  Traction     0     mins 79775  Low Eval     35     Mins  97045  Mod Eval     0     Mins  98660  High Eval                       0     Mins  80128  Hot pack     0     Mins    Cold pack                       0     Mins      Timed Treatment:   10   mins   Total Treatment:     45   mins    PT SIGNATURE: Zhanna Gutiérrez PT      Electronically signed 2021  [place] License: [id number]  NPI number: [id number]    Initial Certification    Certification Period: 2021 thru 3/1/2022  I certify that the therapy services are furnished while this patient is under my care.  The services outlined above are required by this patient, and will be reviewed every 90 days.     PHYSICIAN: Evert Villarreal MD      DATE:     Please sign and return via fax to [phone].. Thank you, [facility].

## 2021-12-07 ENCOUNTER — TREATMENT (OUTPATIENT)
Dept: PHYSICAL THERAPY | Facility: CLINIC | Age: 76
End: 2021-12-07

## 2021-12-07 DIAGNOSIS — M54.42 CHRONIC BILATERAL LOW BACK PAIN WITH LEFT-SIDED SCIATICA: Primary | ICD-10-CM

## 2021-12-07 DIAGNOSIS — M25.552 LEFT HIP PAIN: ICD-10-CM

## 2021-12-07 DIAGNOSIS — R26.2 DIFFICULTY WALKING: ICD-10-CM

## 2021-12-07 DIAGNOSIS — G89.29 CHRONIC BILATERAL LOW BACK PAIN WITH LEFT-SIDED SCIATICA: Primary | ICD-10-CM

## 2021-12-07 PROCEDURE — 97530 THERAPEUTIC ACTIVITIES: CPT | Performed by: PHYSICAL THERAPIST

## 2021-12-07 PROCEDURE — 97140 MANUAL THERAPY 1/> REGIONS: CPT | Performed by: PHYSICAL THERAPIST

## 2021-12-07 PROCEDURE — 97110 THERAPEUTIC EXERCISES: CPT | Performed by: PHYSICAL THERAPIST

## 2021-12-07 NOTE — PROGRESS NOTES
Physical Therapy Daily Progress Note        Patient: Maegan Madera   : 1945  Diagnosis/ICD-10 Code:  Chronic bilateral low back pain with left-sided sciatica [M54.42, G89.29]  Referring practitioner: Evert Villarreal MD  Date of Initial Visit: No linked episodes  Today's Date: 2021  Patient seen for Visit count could not be calculated. Make sure you are using a visit which is associated with an episode. sessions             Subjective   Maegan Mdaera reports having increased pain in left hip after increased standing and walking yesterday while [employer].  She rates her pain at 8/10 upon arrival.    Objective       Bilateral Hip Abductor Strength:  4-/5    See Exercise, Manual, and Modality Logs for complete treatment.       Assessment/Plan  Pt tolerated therapy session well - with progression of therapeutic exercises, CKC-Functional activities, and Manual therapy. She has improved, but continues to have deficits in Trunk and Left Hip/Knee ROM,  Strength, and Stability; limiting function and ability to perform ADLs at this time.  Pt presents with notable tightness and limited ROM into Left Hip extension/Abduction/ and Rotation.   She responded well to manual stretching- reporting slight decrease in pain post session.    Progress per Plan of Care            Timed:  Manual Therapy:    15     mins  35347;  Therapeutic Exercise:    15     mins  72051;     Neuromuscular Farhan:    0    mins  44951;    Therapeutic Activity:     8     mins  25981;     Gait Trainin     mins  31974;     Ultrasound:     0     mins  72752;    Electrical Stimulation:    0     mins  12448;  Iontophoresis     0     mins  18248    Untimed:  Electrical Stimulation:    0     mins  01458 ( );  Mechanical Traction:    0     mins  63075;   Fluidotherapy     0     mins  38706  Hot pack     0     mins  39136  Cold pack     0     mins  10044    Timed Treatment:   38   mins   Total Treatment:     38   mins        Babs  LANETTE Davis  Physical Therapist Assistant

## 2021-12-10 ENCOUNTER — TREATMENT (OUTPATIENT)
Dept: PHYSICAL THERAPY | Facility: CLINIC | Age: 76
End: 2021-12-10

## 2021-12-10 DIAGNOSIS — G89.29 CHRONIC BILATERAL LOW BACK PAIN WITH LEFT-SIDED SCIATICA: Primary | ICD-10-CM

## 2021-12-10 DIAGNOSIS — M25.552 LEFT HIP PAIN: ICD-10-CM

## 2021-12-10 DIAGNOSIS — R26.2 DIFFICULTY WALKING: ICD-10-CM

## 2021-12-10 DIAGNOSIS — M54.42 CHRONIC BILATERAL LOW BACK PAIN WITH LEFT-SIDED SCIATICA: Primary | ICD-10-CM

## 2021-12-10 PROCEDURE — 97140 MANUAL THERAPY 1/> REGIONS: CPT | Performed by: PHYSICAL THERAPIST

## 2021-12-10 PROCEDURE — 97530 THERAPEUTIC ACTIVITIES: CPT | Performed by: PHYSICAL THERAPIST

## 2021-12-10 PROCEDURE — 97110 THERAPEUTIC EXERCISES: CPT | Performed by: PHYSICAL THERAPIST

## 2021-12-10 NOTE — PROGRESS NOTES
Physical Therapy Daily Progress Note        Patient: Maegan Madera   : 1945  Diagnosis/ICD-10 Code:  Chronic bilateral low back pain with left-sided sciatica [M54.42, G89.29]  Referring practitioner: Evert Villarreal MD  Date of Initial Visit: Type: THERAPY  Noted: 2021  Today's Date: 12/10/2021  Patient seen for 3 sessions             Subjective   Maegan Madera reports that her Left hip felt a little better after stretching last therapy session.  She does however, continue to report having increased pain if she stands for very long.  She rates her left lower back, hip and leg pain at 7/10 upon arrival today.  She does report that her hip continues to be very tight.    Objective     Bilateral Hip Abductor Strength:  4-/5    Severe tightness with decrease left hip joint mobility noted     See Exercise and Manual Logs for complete treatment.       Assessment/Plan     Pt tolerated therapy session well - with progression of therapeutic exercises, CKC-Functional activities, and Manual therapy. She has improved, but continues to have deficits in Trunk and Left Hip/Knee ROM,  Strength, and Stability; limiting function and ability to perform ADLs at this time.  Pt presents with notable tightness and limited ROM into Left Hip extension/Abduction/ and Rotation. She continues to  respond well to manual stretching- reporting slight decrease in pain post session.       Progress per Plan of Care           Timed:  Manual Therapy:    15     mins  31378;  Therapeutic Exercise:    15     mins  49786;     Neuromuscular Farhan:    0    mins  67818;    Therapeutic Activity:     8     mins  56048;     Gait Trainin     mins  63552;     Ultrasound:     0     mins  67868;    Electrical Stimulation:    0     mins  26936;  Iontophoresis     0     mins  45460    Untimed:  Electrical Stimulation:    0     mins  48080 ( );  Mechanical Traction:    0     mins  22406;   Fluidotherapy     0     mins  95311  Hot pack     0      mins  22712  Cold pack     0     mins  97136    Timed Treatment:   38   mins   Total Treatment:     38   mins        Babs Davis PTA  Physical Therapist Assistant

## 2021-12-13 ENCOUNTER — TREATMENT (OUTPATIENT)
Dept: PHYSICAL THERAPY | Facility: CLINIC | Age: 76
End: 2021-12-13

## 2021-12-13 DIAGNOSIS — G89.29 CHRONIC BILATERAL LOW BACK PAIN WITH LEFT-SIDED SCIATICA: Primary | ICD-10-CM

## 2021-12-13 DIAGNOSIS — M25.552 LEFT HIP PAIN: ICD-10-CM

## 2021-12-13 DIAGNOSIS — R26.2 DIFFICULTY WALKING: ICD-10-CM

## 2021-12-13 DIAGNOSIS — M54.42 CHRONIC BILATERAL LOW BACK PAIN WITH LEFT-SIDED SCIATICA: Primary | ICD-10-CM

## 2021-12-13 PROCEDURE — 97110 THERAPEUTIC EXERCISES: CPT | Performed by: PHYSICAL THERAPIST

## 2021-12-13 PROCEDURE — 97140 MANUAL THERAPY 1/> REGIONS: CPT | Performed by: PHYSICAL THERAPIST

## 2021-12-13 NOTE — PROGRESS NOTES
Physical Therapy Daily Progress Note        Patient: Maegan Madera   : 1945  Diagnosis/ICD-10 Code:  Chronic bilateral low back pain with left-sided sciatica [M54.42, G89.29]  Referring practitioner: Evert Villarreal MD  Date of Initial Visit: Type: THERAPY  Noted: 2021  Today's Date: 2021  Patient seen for 4 sessions             Subjective   Maegan Madera reports having increased pain and stiffness in left lower back and hip after having to sit and ride in car more this weekend.  She rates her pain at 6/10 upon arrival today.    Objective     PROM-Left Hip:    Abduction:  PROM:  15 degrees  Internal Rotation PROM:  19 degrees  External Rotation PROM:  35 degrees    See Exercise and Manual Logs for complete treatment.       Assessment/Plan    Pt tolerated therapy session well - with progression of therapeutic exercises, CKC-Functional activities, and Manual therapy. She has improved, but continues to have deficits in Trunk and Left Hip/Knee ROM,  Strength, and Stability; limiting function and ability to perform ADLs at this time.  Pt presents with notable tightness and limited ROM into Left Hip extension/Abduction/ and Rotation. She continues to  respond well to manual stretching- reporting slight decrease in pain post session.    Progress per Plan of Care           Timed:  Manual Therapy:    15     mins  03747;  Therapeutic Exercise:    15     mins  31849;     Neuromuscular Farhan:    0    mins  53354;    Therapeutic Activity:     0     mins  84199;     Gait Trainin     mins  95314;     Ultrasound:     0     mins  15450;    Electrical Stimulation:    0     mins  38734;  Iontophoresis     0     mins  32821    Untimed:  Electrical Stimulation:    0     mins  90620 ( );  Mechanical Traction:    0     mins  69881;   Fluidotherapy     0     mins  40187  Hot pack     0     mins  11109  Cold pack     0     mins  85293    Timed Treatment:   30   mins   Total Treatment:     30    cierra Davis PTA  Physical Therapist Assistant

## 2021-12-16 ENCOUNTER — TREATMENT (OUTPATIENT)
Dept: PHYSICAL THERAPY | Facility: CLINIC | Age: 76
End: 2021-12-16

## 2021-12-16 DIAGNOSIS — G89.29 CHRONIC BILATERAL LOW BACK PAIN WITH LEFT-SIDED SCIATICA: Primary | ICD-10-CM

## 2021-12-16 DIAGNOSIS — M25.552 LEFT HIP PAIN: ICD-10-CM

## 2021-12-16 DIAGNOSIS — R26.2 DIFFICULTY WALKING: ICD-10-CM

## 2021-12-16 DIAGNOSIS — M54.42 CHRONIC BILATERAL LOW BACK PAIN WITH LEFT-SIDED SCIATICA: Primary | ICD-10-CM

## 2021-12-16 PROCEDURE — 97140 MANUAL THERAPY 1/> REGIONS: CPT | Performed by: PHYSICAL THERAPIST

## 2021-12-16 PROCEDURE — 97110 THERAPEUTIC EXERCISES: CPT | Performed by: PHYSICAL THERAPIST

## 2021-12-16 PROCEDURE — 97530 THERAPEUTIC ACTIVITIES: CPT | Performed by: PHYSICAL THERAPIST

## 2021-12-16 NOTE — PROGRESS NOTES
"Physical Therapy Daily Progress Note        Patient: Maegan Madera   : 1945  Diagnosis/ICD-10 Code:  Chronic bilateral low back pain with left-sided sciatica [M54.42, G89.29]  Referring practitioner: Evert Villarreal MD  Date of Initial Visit: No linked episodes  Today's Date: 2021  Patient seen for Visit count could not be calculated. Make sure you are using a visit which is associated with an episode. sessions             Subjective   Maegan Madera reports that her left hip and lower back seem to be \"Slowly getting better\", but continues to report persistent tightness and pain.  She reports that HEP is going well.    Objective     PROM-Left Hip:  Extension:  PROM:   Neutral to 5 degrees  Abduction:  PROM:  15 degrees  Internal Rotation PROM:  20 degrees  External Rotation PROM:  45 degrees    See Exercise and Manual Logs for complete treatment.       Assessment/Plan     Pt tolerated therapy session well - with progression of therapeutic exercises, CKC-Functional activities, and Manual therapy. She has improved, but continues to have deficits in Trunk and Left Hip/Knee ROM,  Strength, and Stability; limiting function and ability to perform ADLs at this time.  She continues to  present with notable tightness and limited ROM into Left Hip extension/Abduction/ and Rotation. She continues to  respond well to manual stretching- reporting slight decrease in pain post session.    Progress per Plan of Care           Timed:  Manual Therapy:    15     mins  59599;  Therapeutic Exercise:    13     mins  58099;     Neuromuscular Farhan:    0    mins  30232;    Therapeutic Activity:     10     mins  09232;     Gait Trainin     mins  05785;     Ultrasound:     0     mins  77177;    Electrical Stimulation:    0     mins  21743;  Iontophoresis     0     mins  10172    Untimed:  Electrical Stimulation:    0     mins  77345 ( );  Mechanical Traction:    0     mins  92617;   Fluidotherapy     0     mins  " 65120  Hot pack     0     mins  18940  Cold pack     0     mins  41708    Timed Treatment:   38   mins   Total Treatment:     38   mins        Babs Davis PTA  Physical Therapist Assistant

## 2021-12-20 ENCOUNTER — TREATMENT (OUTPATIENT)
Dept: PHYSICAL THERAPY | Facility: CLINIC | Age: 76
End: 2021-12-20

## 2021-12-20 DIAGNOSIS — G89.29 CHRONIC BILATERAL LOW BACK PAIN WITH LEFT-SIDED SCIATICA: Primary | ICD-10-CM

## 2021-12-20 DIAGNOSIS — R26.2 DIFFICULTY WALKING: ICD-10-CM

## 2021-12-20 DIAGNOSIS — M25.552 LEFT HIP PAIN: ICD-10-CM

## 2021-12-20 DIAGNOSIS — M54.42 CHRONIC BILATERAL LOW BACK PAIN WITH LEFT-SIDED SCIATICA: Primary | ICD-10-CM

## 2021-12-20 PROCEDURE — 97530 THERAPEUTIC ACTIVITIES: CPT | Performed by: PHYSICAL THERAPIST

## 2021-12-20 PROCEDURE — 97110 THERAPEUTIC EXERCISES: CPT | Performed by: PHYSICAL THERAPIST

## 2021-12-20 PROCEDURE — 97140 MANUAL THERAPY 1/> REGIONS: CPT | Performed by: PHYSICAL THERAPIST

## 2021-12-20 NOTE — PROGRESS NOTES
Physical Therapy Daily Progress Note        Patient: Maegan RAMIREZ Post   : 1945  Diagnosis/ICD-10 Code:  Chronic bilateral low back pain with left-sided sciatica [M54.42, G89.29]  Referring practitioner: Evert Villarreal MD  Date of Initial Visit: Type: THERAPY  Noted: 2021  Today's Date: 2021  Patient seen for 6 sessions             Subjective   Maegan Post reports: 6/10 L hip and left pain today.     Objective   Left hip abduction MMT: 4-/5  See Exercise, Manual, and Modality Logs for complete treatment.       Assessment/Plan  Patient tolerated all exercise progressions and manual therapy well today but continues to demonstrate strength/ROM deficits limiting function. Continue to progress per patient tolerance.    Progress per Plan of Care           Timed:  Manual Therapy:    20     mins  90609;  Therapeutic Exercise:    10     mins  14089;     Neuromuscular Farhan:    0    mins  15893;    Therapeutic Activity:     8     mins  02430;     Gait Trainin     mins  67710;     Ultrasound:     0     mins  99929;    Electrical Stimulation:    0     mins  97979;  Iontophoresis     0     mins  82312    Untimed:  Electrical Stimulation:    0     mins  93698 ( );  Mechanical Traction:    0     mins  22328;   Fluidotherapy     0     mins  83263  Hot pack     0     Mins    Cold pack                       0     Mins     Timed Treatment:   38   mins   Total Treatment:     38   mins        Zhanna Gutiérrez PT    Electronically signed [unfilled]  [place] License: [id number]  NPI number: [id number]

## 2021-12-22 ENCOUNTER — TREATMENT (OUTPATIENT)
Dept: PHYSICAL THERAPY | Facility: CLINIC | Age: 76
End: 2021-12-22

## 2021-12-22 DIAGNOSIS — R26.2 DIFFICULTY WALKING: ICD-10-CM

## 2021-12-22 DIAGNOSIS — M54.42 CHRONIC BILATERAL LOW BACK PAIN WITH LEFT-SIDED SCIATICA: Primary | ICD-10-CM

## 2021-12-22 DIAGNOSIS — G89.29 CHRONIC BILATERAL LOW BACK PAIN WITH LEFT-SIDED SCIATICA: Primary | ICD-10-CM

## 2021-12-22 DIAGNOSIS — M25.552 LEFT HIP PAIN: ICD-10-CM

## 2021-12-22 PROCEDURE — 97110 THERAPEUTIC EXERCISES: CPT | Performed by: PHYSICAL THERAPIST

## 2021-12-22 PROCEDURE — 97530 THERAPEUTIC ACTIVITIES: CPT | Performed by: PHYSICAL THERAPIST

## 2021-12-22 PROCEDURE — 97140 MANUAL THERAPY 1/> REGIONS: CPT | Performed by: PHYSICAL THERAPIST

## 2021-12-22 NOTE — PROGRESS NOTES
Physical Therapy Daily Progress Note        Patient: Maegan Madera   : 1945  Diagnosis/ICD-10 Code:  Chronic bilateral low back pain with left-sided sciatica [M54.42, G89.29]  Referring practitioner: Evert Villarreal MD  Date of Initial Visit: Type: THERAPY  Noted: 2021  Today's Date: 2021  Patient seen for 7 sessions             Subjective   Maegan Madera rates her lower back and left hip pain at 5/10 upon arrival today.    Objective     PROM-Left Hip:  Extension:  PROM:   Neutral to 5 degrees  Abduction:  PROM:  85 degrees  Internal Rotation PROM:  23 degrees  External Rotation PROM:  65 degrees    See Exercise, Manual, and Modality Logs for complete treatment.       Assessment/Plan     Pt tolerated therapy session well - with progression of therapeutic exercises, CKC-Functional activities, and Manual therapy. She has improved, but continues to have deficits in Trunk and Left Hip/Knee ROM,  Strength, and Stability; limiting function and ability to perform ADLs at this time.  She continues to  present with notable tightness and limited ROM into Left Hip extension/Abduction/ and Rotation. She continues to  respond well to manual stretching- reporting slight decrease in pain post session.  Slight increase in Left hip PROM measurements noted today as compared to previous measurments.    Progress per Plan of Care           Timed:  Manual Therapy:    15     mins  36960;  Therapeutic Exercise:    13     mins  48051;     Neuromuscular Farhan:    0    mins  25022;    Therapeutic Activity:     10     mins  17379;     Gait Trainin     mins  62505;     Ultrasound:     0     mins  95636;    Electrical Stimulation:    0     mins  04823;  Iontophoresis     0     mins  61873    Untimed:  Electrical Stimulation:    0     mins  52147 ( );  Mechanical Traction:    0     mins  48868;   Fluidotherapy     0     mins  47002  Hot pack     0     mins  74670  Cold pack     0     mins  92739    Timed  Treatment:   38   mins   Total Treatment:     38   mins        Babs Davis PTA  Physical Therapist Assistant

## 2021-12-27 ENCOUNTER — TREATMENT (OUTPATIENT)
Dept: PHYSICAL THERAPY | Facility: CLINIC | Age: 76
End: 2021-12-27

## 2021-12-27 DIAGNOSIS — G89.29 CHRONIC BILATERAL LOW BACK PAIN WITH LEFT-SIDED SCIATICA: Primary | ICD-10-CM

## 2021-12-27 DIAGNOSIS — R26.2 DIFFICULTY WALKING: ICD-10-CM

## 2021-12-27 DIAGNOSIS — M25.552 LEFT HIP PAIN: ICD-10-CM

## 2021-12-27 DIAGNOSIS — M54.42 CHRONIC BILATERAL LOW BACK PAIN WITH LEFT-SIDED SCIATICA: Primary | ICD-10-CM

## 2021-12-27 PROCEDURE — 97140 MANUAL THERAPY 1/> REGIONS: CPT | Performed by: PHYSICAL THERAPIST

## 2021-12-27 PROCEDURE — 97530 THERAPEUTIC ACTIVITIES: CPT | Performed by: PHYSICAL THERAPIST

## 2021-12-27 PROCEDURE — 97110 THERAPEUTIC EXERCISES: CPT | Performed by: PHYSICAL THERAPIST

## 2021-12-27 NOTE — PROGRESS NOTES
Physical Therapy Daily Progress Note        Patient: Maegan Madera   : 1945  Diagnosis/ICD-10 Code:  Chronic bilateral low back pain with left-sided sciatica [M54.42, G89.29]  Referring practitioner: Evert Villarreal MD  Date of Initial Visit: No linked episodes  Today's Date: 2021  Patient seen for Visit count could not be calculated. Make sure you are using a visit which is associated with an episode. sessions             Subjective   Maegan Madera reports reports that she has same pain - rating left hip pain at 5/10.  She reports having the greatest Left lower back and hip pain after standing too long.    Objective     PROM-Left Hip:  Extension:  PROM:   Neutral to 5 degrees  Abduction:  PROM:  15 degrees  Internal Rotation PROM:  23 degrees  External Rotation PROM:  60 degrees    See Exercise and Manual Logs for complete treatment.       Assessment/Plan     Pt tolerated therapy session well - with progression of therapeutic exercises, CKC-Functional activities, and Manual therapy. She has improved, but continues to have deficits in Trunk and Left Hip/Knee ROM,  Strength, and Stability; limiting function and ability to perform ADLs at this time.  She continues to  present with notable tightness and limited ROM into Left Hip extension/Abduction/ and Internal  Rotation greater than External Rotation. She continues to  respond well to manual stretching- reporting slight decrease in pain post session.  Slight increase in Left hip PROM measurements noted today as compared to previous measurments.       Progress per Plan of Care           Timed:  Manual Therapy:    15     mins  31630;  Therapeutic Exercise:    13     mins  09823;     Neuromuscular Farhan:    0    mins  53495;    Therapeutic Activity:     10     mins  70781;     Gait Trainin     mins  76478;     Ultrasound:     0     mins  29054;    Electrical Stimulation:    0     mins  58859;  Iontophoresis     0     mins   59193    Untimed:  Electrical Stimulation:    0     mins  62540 ( );  Mechanical Traction:    0     mins  47595;   Fluidotherapy     0     mins  01180  Hot pack     0     mins  11445  Cold pack     0     mins  64569    Timed Treatment:   38   mins   Total Treatment:     38   mins        Babs Davis PTA  Physical Therapist Assistant

## 2021-12-30 ENCOUNTER — TREATMENT (OUTPATIENT)
Dept: PHYSICAL THERAPY | Facility: CLINIC | Age: 76
End: 2021-12-30

## 2021-12-30 DIAGNOSIS — R26.2 DIFFICULTY WALKING: ICD-10-CM

## 2021-12-30 DIAGNOSIS — M54.42 CHRONIC BILATERAL LOW BACK PAIN WITH LEFT-SIDED SCIATICA: Primary | ICD-10-CM

## 2021-12-30 DIAGNOSIS — G89.29 CHRONIC BILATERAL LOW BACK PAIN WITH LEFT-SIDED SCIATICA: Primary | ICD-10-CM

## 2021-12-30 DIAGNOSIS — M25.552 LEFT HIP PAIN: ICD-10-CM

## 2021-12-30 PROCEDURE — 97140 MANUAL THERAPY 1/> REGIONS: CPT | Performed by: PHYSICAL THERAPIST

## 2021-12-30 PROCEDURE — 97530 THERAPEUTIC ACTIVITIES: CPT | Performed by: PHYSICAL THERAPIST

## 2021-12-30 PROCEDURE — 97110 THERAPEUTIC EXERCISES: CPT | Performed by: PHYSICAL THERAPIST

## 2021-12-30 NOTE — PROGRESS NOTES
Physical Therapy Daily Progress Note        Patient: Maegan RAMIREZ Post   : 1945  Diagnosis/ICD-10 Code:  Chronic bilateral low back pain with left-sided sciatica [M54.42, G89.29]  Referring practitioner: Evert Villarreal MD  Date of Initial Visit: Type: THERAPY  Noted: 2021  Today's Date: 2021  Patient seen for 9 sessions             Subjective   Maegan Post reports: moderate L hip pain at beginning of session today.    Objective   Bilateral hip abduction: 4-/5  See Exercise, Manual, and Modality Logs for complete treatment.       Assessment/Plan  Patient tolerated all exercise progressions and manual therapy well today but continues to demonstrate strength/ROM deficits limiting function. Continue to progress per patient tolerance.    Progress per Plan of Care           Timed:  Manual Therapy:    15     mins  20130;  Therapeutic Exercise:    13     mins  35643;     Neuromuscular Farhan:    0    mins  01314;    Therapeutic Activity:     10     mins  26319;     Gait Trainin     mins  97403;     Ultrasound:     0     mins  39010;    Electrical Stimulation:    0     mins  10649;  Iontophoresis     0     mins  15071    Untimed:  Electrical Stimulation:    0     mins  88159 ( );  Mechanical Traction:    0     mins  96873;   Fluidotherapy     0     mins  97710  Hot pack     0     Mins    Cold pack                       0     Mins     Timed Treatment:   38   mins   Total Treatment:     38   mins        Zhanna Gutiérrez PT    Electronically signed [unfilled]  [place] License: [id number]  NPI number: [id number]

## 2022-01-03 ENCOUNTER — HOSPITAL ENCOUNTER (OUTPATIENT)
Dept: GENERAL RADIOLOGY | Facility: HOSPITAL | Age: 77
Discharge: HOME | End: 2022-01-03
Admitting: INTERNAL MEDICINE

## 2022-01-03 ENCOUNTER — TREATMENT (OUTPATIENT)
Dept: PHYSICAL THERAPY | Facility: CLINIC | Age: 77
End: 2022-01-03

## 2022-01-03 ENCOUNTER — TRANSCRIBE ORDERS (OUTPATIENT)
Dept: GENERAL RADIOLOGY | Facility: HOSPITAL | Age: 77
End: 2022-01-03

## 2022-01-03 DIAGNOSIS — M54.42 CHRONIC BILATERAL LOW BACK PAIN WITH LEFT-SIDED SCIATICA: Primary | ICD-10-CM

## 2022-01-03 DIAGNOSIS — G89.29 CHRONIC BILATERAL LOW BACK PAIN WITH LEFT-SIDED SCIATICA: Primary | ICD-10-CM

## 2022-01-03 DIAGNOSIS — R52 PAIN: Primary | ICD-10-CM

## 2022-01-03 DIAGNOSIS — M25.552 LEFT HIP PAIN: ICD-10-CM

## 2022-01-03 DIAGNOSIS — R52 PAIN: ICD-10-CM

## 2022-01-03 DIAGNOSIS — R26.2 DIFFICULTY WALKING: ICD-10-CM

## 2022-01-03 PROCEDURE — 97140 MANUAL THERAPY 1/> REGIONS: CPT | Performed by: PHYSICAL THERAPIST

## 2022-01-03 PROCEDURE — 97110 THERAPEUTIC EXERCISES: CPT | Performed by: PHYSICAL THERAPIST

## 2022-01-03 PROCEDURE — 97530 THERAPEUTIC ACTIVITIES: CPT | Performed by: PHYSICAL THERAPIST

## 2022-01-03 PROCEDURE — 73502 X-RAY EXAM HIP UNI 2-3 VIEWS: CPT

## 2022-01-03 NOTE — PROGRESS NOTES
Discharge note    Patient: Maegan RAMIREZ Post   : 1945  Diagnosis/ICD-10 Code:  Chronic bilateral low back pain with left-sided sciatica [M54.42, G89.29]  Referring practitioner: No ref. provider found  Date of Initial Visit: Type: THERAPY  Noted: 2021  Today's Date: 1/3/2022  Patient seen for 10 sessions    Progress note due: 2022  Re-cert due: 4/3/2022      Subjective:   Maegan Post reports: 6/10 L hip pain at beginning of session today.  Pt reports that she feels her hip has gotten stronger but she still has increased L hip pain limiting prolonged walking, standing and stairs.  Subjective Questionnaire: KAL: 10/45  Clinical Progress: unchanged  Home Program Compliance: Yes  Treatment has included: therapeutic exercise, manual therapy and therapeutic activity    Subjective   Objective          Strength/Myotome Testing     Left Hip   Planes of Motion   Flexion: 4+  Extension: 4-  Abduction: 4-    Right Hip   Planes of Motion   Flexion: 4+  Extension: 4-  Abduction: 4-    Left Knee   Flexion: 4+  Extension: 4+    Right Knee   Flexion: 4+  Extension: 4+    Left Ankle/Foot   Dorsiflexion: 4+    Right Ankle/Foot   Dorsiflexion: 4+    Tests     Left Hip   Positive DARIEN and natacha.       Assessment & Plan     Assessment  Impairments: abnormal gait, abnormal muscle firing, abnormal or restricted ROM, activity intolerance, impaired balance, impaired physical strength, lacks appropriate home exercise program, pain with function, safety issue and weight-bearing intolerance  Functional Limitations: sleeping, walking, uncomfortable because of pain, moving in bed, sitting, standing and stooping  Assessment details: Patient demonstrates improvements in L hip and knee strength but continues to report increased left hip pain limiting function as shown on the KAL.  Pt continues to demonstrate signs/symptoms consistent with L hip arthritis and may benefit from further imaging on the L hip. Pt has not made significant  progress towards set pain and functional goals and will be discharged from skilled PT at this time. Pt referred back to MD for further evaluation.   Prognosis: good    Goals  Plan Goals: 1. The patient complains of low back/L hip pain.  LTG 1: 12 weeks:  The patient will report a pain rating of 2 or better in order to improve  tolerance to activities of daily living and improve sleep quality.  STATUS:  Not met  STG 1a: 6 weeks:  The patient will report a pain rating of 3 or better.  STATUS:  Not met  TREATMENT:  Therapeutic exercises, manual therapy, aquatic therapy, home exercise   instruction, and modalities as needed for pain to include:  electrical stimulation, moist heat, ice,   ultrasound, and diathermy.      2. The patient demonstrates weakness of the bilateral hips.  LTG 2: 12 weeks:  The patient will demonstrate 4+ /5 strength for bilateral hip flexion, abduction,  and extension in order to improve hip stability.  STATUS:  Not met  STG 2a: 6 weeks:  The patient will demonstrate 4 /5 strength for bilateral hip flexion, abduction,  and extension.  STATUS:  Partially met  TREATMENT: Therapeutic exercises, manual therapy, aquatic therapy, home exercise instruction,  and modalities as needed for pain to include:  electrical stimulation, moist heat, ice, ultrasound, and   diathermy.      3. The patient has gait dysfunction.  LTG 3: 12 weeks:  The patient will ambulate without assistive device, independently, for   community distances with minimal limp to the L lower extremity in order to improve mobility and allow   patient to perform activities such as grocery shopping with greater ease.  STATUS:  Not met  TREATMENT: Gait training, aquatic therapy, therapeutic exercise, and home exercise instruction.    3. Mobility: Walking/Moving Around Functional Limitation    LTG 3: 12 weeks:  The patient will demonstrate 1-19 % limitation by achieving a score of 5/45 on the KAL.  STATUS:  Not met  TREATMENT:  Manual therapy,  therapeutic exercise, home exercise instruction, and modalities as needed to include: moist heat, electrical stimulation, and ultrasound.      Plan  Therapy options: will be seen for skilled therapy services  Planned modality interventions: cryotherapy, electrical stimulation/Russian stimulation and TENS  Planned therapy interventions: balance/weight-bearing training, ADL retraining, soft tissue mobilization, strengthening, stretching, therapeutic activities, joint mobilization, home exercise program, gait training, functional ROM exercises, flexibility, body mechanics training, postural training, neuromuscular re-education and manual therapy  Frequency: 2x week  Duration in weeks: 12  Treatment plan discussed with: patient      Progress toward previous goals: Not Met      Recommendations: Discharge    PT SIGNATURE: Zhanna Gutiérrez, PT     Electronically signed 1/3/2022  DATE TREATMENT INITIATED: 1/3/2022  [place] License: [id number]     Certification Period: 1/3/2022 thru 2022    Based upon review of the patient's progress and continued therapy plan, it is my medical opinion that Maegan Madera should continue physical therapy treatment at [facility]  [address] [place] [address] [address] [id number] [phone].    Signature: __________________________________      Timed:  Manual Therapy:    15     mins  67302;  Therapeutic Exercise:    13     mins  56856;     Neuromuscular Farhan:    0    mins  81452;    Therapeutic Activity:     10     mins  28052;     Gait Trainin     mins  39515;     Ultrasound:     0     mins  15609;    Electrical Stimulation:    0     mins  53259 ( );    Untimed:  Electrical Stimulation:    0     mins  87396 ( );  Mechanical Traction:    0     mins  84022;     Timed Treatment:   38   mins   Total Treatment:     45   mins

## 2022-03-02 ENCOUNTER — OFFICE VISIT (OUTPATIENT)
Dept: ORTHOPEDIC SURGERY | Facility: CLINIC | Age: 77
End: 2022-03-02

## 2022-03-02 ENCOUNTER — PREP FOR SURGERY (OUTPATIENT)
Dept: OTHER | Facility: HOSPITAL | Age: 77
End: 2022-03-02

## 2022-03-02 VITALS — BODY MASS INDEX: 24.67 KG/M2 | OXYGEN SATURATION: 96 % | HEIGHT: 68 IN | WEIGHT: 162.8 LBS | HEART RATE: 68 BPM

## 2022-03-02 DIAGNOSIS — M16.0 PRIMARY OSTEOARTHRITIS OF BOTH HIPS: Primary | ICD-10-CM

## 2022-03-02 PROBLEM — M16.12 PRIMARY OSTEOARTHRITIS OF LEFT HIP: Status: ACTIVE | Noted: 2022-03-02

## 2022-03-02 PROCEDURE — 99204 OFFICE O/P NEW MOD 45 MIN: CPT | Performed by: ORTHOPAEDIC SURGERY

## 2022-03-02 RX ORDER — CEFAZOLIN SODIUM IN 0.9 % NACL 3 G/100 ML
3 INTRAVENOUS SOLUTION, PIGGYBACK (ML) INTRAVENOUS ONCE
Status: CANCELLED | OUTPATIENT
Start: 2022-03-02 | End: 2022-03-02

## 2022-03-02 RX ORDER — TRANEXAMIC ACID 10 MG/ML
1000 INJECTION, SOLUTION INTRAVENOUS ONCE
Status: CANCELLED | OUTPATIENT
Start: 2022-03-02 | End: 2022-03-02

## 2022-03-02 RX ORDER — SUCRALFATE 1 G/1
1 TABLET ORAL 4 TIMES DAILY
COMMUNITY
Start: 2022-02-17

## 2022-03-02 RX ORDER — CEFAZOLIN SODIUM 2 G/100ML
2 INJECTION, SOLUTION INTRAVENOUS ONCE
Status: CANCELLED | OUTPATIENT
Start: 2022-03-02 | End: 2022-03-02

## 2022-03-02 RX ORDER — PANTOPRAZOLE SODIUM 40 MG/1
40 TABLET, DELAYED RELEASE ORAL 2 TIMES DAILY
COMMUNITY
Start: 2021-12-29

## 2022-03-02 RX ORDER — CELECOXIB 200 MG/1
200 CAPSULE ORAL 2 TIMES DAILY
COMMUNITY
Start: 2022-02-08

## 2022-03-02 NOTE — PROGRESS NOTES
"Chief Complaint  Initial Evaluation of the Left Hip     Subjective      Maegan Madera presents to [facility] for a follow-up of left hip. Patient states pain posteriorly and in the groin. She states pain radiates down to the knee but she believes it is due to how she is walking. She states no pain in the right hip at this time. She has pain with the limited range of motion in the hip.     No Known Allergies     Social History     Socioeconomic History   • Marital status:    Tobacco Use   • Smoking status: Former Smoker   • Smokeless tobacco: Never Used   Substance and Sexual Activity   • Alcohol use: Never        Review of Systems     Objective   Vital Signs:   Pulse 68   Ht 172.7 cm (68\")   Wt 73.8 kg (162 lb 12.8 oz)   SpO2 96%   BMI 24.75 kg/m²       Physical Exam  Constitutional:       Appearance: Normal appearance. Patient is well-developed and normal weight.   HENT:      Head: Normocephalic.      Right Ear: Hearing and external ear normal.      Left Ear: Hearing and external ear normal.      Nose: Nose normal.   Eyes:      Conjunctiva/sclera: Conjunctivae normal.   Cardiovascular:      Rate and Rhythm: Normal rate.   Pulmonary:      Effort: Pulmonary effort is normal.      Breath sounds: No wheezing or rales.   Abdominal:      Palpations: Abdomen is soft.      Tenderness: There is no abdominal tenderness.   Musculoskeletal:      Cervical back: Normal range of motion.   Skin:     Findings: No rash.   Neurological:      Mental Status: Patient  is alert and oriented to person, place, and time.   Psychiatric:         Mood and Affect: Mood and affect normal.         Judgment: Judgment normal.       Ortho Exam      LEFT HIP: Good tone of hip flexors, hip extensors, hip adductor, hip abductors. Good strength to hamstrings, quadriceps, dorsiflexors and plantar flexors. Calf supple, non-tender, no signs of DVT. Dorsal Pedal Pulse 2+, posterior tibialis pulse 2+. Limping gait. " Tender hip and pelvic muscles. 5 degrees of IR. 120 degrees of flexion. ER 25 degrees. Groin pain with hip motion.       Procedures        Imaging Results (Most Recent)     Procedure Component Value Units Date/Time    XR Hip With or Without Pelvis 2 - 3 View Left [[id number]] Resulted: 03/02/22 1325     Updated: 03/02/22 1325    Narrative:      X-Ray Report:  Left hip(s) X-Ray  Indication: Evaluation of left hip pain   AP and Lateral view(s)  Findings: Demonstrates severely advanced degenerative changes. No acute   fracture.   Prior studies available for comparison: yes            Result Review :       X-Ray Report:  Left hip(s) X-Ray  Indication: Evaluation of left hip pain   AP and Lateral view(s)  Findings: Demonstrates severely advanced degenerative changes. No acute fracture.   Prior studies available for comparison: yes       PROCEDURE:  XR HIP W OR WO PELVIS 2-3 VIEW LEFT     COMPARISON: None     INDICATIONS:  LEFT HIP PAIN X SEVERAL MONTHS/LIMITED ROM/NKI     FINDINGS:          There is severe degenerative change of the left hip.  There is marked joint space narrowing and   bony sclerosis of the subchondral bone of the acetabulum and femoral head.  Subchondral cystic   changes are also noted of the acetabulum and femoral head.  There is moderate osteophyte formation   of the medial aspect of the femoral head.  No bony erosion or abnormal periosteal reaction   identified.  There are severe degenerative changes of the right hip is well with joint space   narrowing and subchondral cystic degenerative change of the right femoral head.  Sacroiliac joints   are within normal limits.  Soft tissues are unremarkable.     CONCLUSION:   1. Severe degenerative changes of both hips, left greater than right.  2. No acute bony abnormality.         Assessment and Plan     DX: Bilateral hip osteoarthritis     Discussed treatment plans and diagnosis with the patient. She has significant arthritis of bilateral hips however she  has no symptoms in the right at this time. She is a candidate for left hip replacement. Risks, benefits and post-operative treatment discussed.    Discussed surgery., Risks/benefits discussed with patient including, but not limited to: infection, bleeding, neurovascular damage, malunion, nonunion, aesthetic deformity, need for further surgery, and death., Discussed with patient the implant type being used during surgery and patient understands and desires to proceed., Surgery pamphlet given. and Call or return if worsening symptoms.    Follow Up     Post-operatively.       Patient was given instructions and counseling regarding her condition or for health maintenance advice. Please see specific information pulled into the AVS if appropriate.     Scribed for Zane Curry MD by Sonia Natarajan.  03/02/22   13:00 EST      I have personally performed the services described in this document as scribed by the above individual and it is both accurate and complete. Zane Curry MD 03/02/22

## 2022-04-01 DIAGNOSIS — Z96.642 AFTERCARE FOLLOWING LEFT HIP JOINT REPLACEMENT SURGERY: Primary | ICD-10-CM

## 2022-04-01 DIAGNOSIS — M16.0 PRIMARY OSTEOARTHRITIS OF BOTH HIPS: Primary | ICD-10-CM

## 2022-04-01 DIAGNOSIS — Z47.1 AFTERCARE FOLLOWING LEFT HIP JOINT REPLACEMENT SURGERY: Primary | ICD-10-CM

## 2022-04-25 ENCOUNTER — PRE-ADMISSION TESTING (OUTPATIENT)
Dept: PREADMISSION TESTING | Facility: HOSPITAL | Age: 77
End: 2022-04-25

## 2022-04-25 VITALS
OXYGEN SATURATION: 95 % | WEIGHT: 157.19 LBS | HEIGHT: 71 IN | DIASTOLIC BLOOD PRESSURE: 72 MMHG | TEMPERATURE: 98 F | RESPIRATION RATE: 18 BRPM | BODY MASS INDEX: 22.01 KG/M2 | HEART RATE: 88 BPM | SYSTOLIC BLOOD PRESSURE: 124 MMHG

## 2022-04-25 DIAGNOSIS — M16.0 PRIMARY OSTEOARTHRITIS OF BOTH HIPS: ICD-10-CM

## 2022-04-25 LAB
ALBUMIN SERPL-MCNC: 4.3 G/DL (ref 3.5–5.2)
ALBUMIN/GLOB SERPL: 1.7 G/DL
ALP SERPL-CCNC: 61 U/L (ref 39–117)
ALT SERPL W P-5'-P-CCNC: 11 U/L (ref 1–33)
ANION GAP SERPL CALCULATED.3IONS-SCNC: 11.7 MMOL/L (ref 5–15)
AST SERPL-CCNC: 15 U/L (ref 1–32)
BASOPHILS # BLD AUTO: 0.04 10*3/MM3 (ref 0–0.2)
BASOPHILS NFR BLD AUTO: 0.8 % (ref 0–1.5)
BILIRUB SERPL-MCNC: 0.9 MG/DL (ref 0–1.2)
BUN SERPL-MCNC: 11 MG/DL (ref 8–23)
BUN/CREAT SERPL: 13.6 (ref 7–25)
CALCIUM SPEC-SCNC: 9.5 MG/DL (ref 8.6–10.5)
CHLORIDE SERPL-SCNC: 100 MMOL/L (ref 98–107)
CO2 SERPL-SCNC: 25.3 MMOL/L (ref 22–29)
CREAT SERPL-MCNC: 0.81 MG/DL (ref 0.57–1)
DEPRECATED RDW RBC AUTO: 48.6 FL (ref 37–54)
EGFRCR SERPLBLD CKD-EPI 2021: 76.3 ML/MIN/1.73
EOSINOPHIL # BLD AUTO: 0.06 10*3/MM3 (ref 0–0.4)
EOSINOPHIL NFR BLD AUTO: 1.2 % (ref 0.3–6.2)
ERYTHROCYTE [DISTWIDTH] IN BLOOD BY AUTOMATED COUNT: 14.3 % (ref 12.3–15.4)
GLOBULIN UR ELPH-MCNC: 2.5 GM/DL
GLUCOSE SERPL-MCNC: 125 MG/DL (ref 65–99)
HBA1C MFR BLD: 5.7 % (ref 4.8–5.6)
HCT VFR BLD AUTO: 36.7 % (ref 34–46.6)
HGB BLD-MCNC: 12.2 G/DL (ref 12–15.9)
IMM GRANULOCYTES # BLD AUTO: 0.02 10*3/MM3 (ref 0–0.05)
IMM GRANULOCYTES NFR BLD AUTO: 0.4 % (ref 0–0.5)
INR PPP: 0.97 (ref 0.86–1.15)
LYMPHOCYTES # BLD AUTO: 1.17 10*3/MM3 (ref 0.7–3.1)
LYMPHOCYTES NFR BLD AUTO: 22.6 % (ref 19.6–45.3)
MCH RBC QN AUTO: 31 PG (ref 26.6–33)
MCHC RBC AUTO-ENTMCNC: 33.2 G/DL (ref 31.5–35.7)
MCV RBC AUTO: 93.1 FL (ref 79–97)
MONOCYTES # BLD AUTO: 0.37 10*3/MM3 (ref 0.1–0.9)
MONOCYTES NFR BLD AUTO: 7.2 % (ref 5–12)
NEUTROPHILS NFR BLD AUTO: 3.51 10*3/MM3 (ref 1.7–7)
NEUTROPHILS NFR BLD AUTO: 67.8 % (ref 42.7–76)
NRBC BLD AUTO-RTO: 0 /100 WBC (ref 0–0.2)
PLATELET # BLD AUTO: 309 10*3/MM3 (ref 140–450)
PMV BLD AUTO: 9.4 FL (ref 6–12)
POTASSIUM SERPL-SCNC: 3.5 MMOL/L (ref 3.5–5.2)
PROT SERPL-MCNC: 6.8 G/DL (ref 6–8.5)
PROTHROMBIN TIME: 13 SECONDS (ref 11.8–14.9)
RBC # BLD AUTO: 3.94 10*6/MM3 (ref 3.77–5.28)
SODIUM SERPL-SCNC: 137 MMOL/L (ref 136–145)
WBC NRBC COR # BLD: 5.17 10*3/MM3 (ref 3.4–10.8)

## 2022-04-25 PROCEDURE — 83036 HEMOGLOBIN GLYCOSYLATED A1C: CPT | Performed by: ORTHOPAEDIC SURGERY

## 2022-04-25 PROCEDURE — 93005 ELECTROCARDIOGRAM TRACING: CPT

## 2022-04-25 PROCEDURE — 80053 COMPREHEN METABOLIC PANEL: CPT | Performed by: ORTHOPAEDIC SURGERY

## 2022-04-25 PROCEDURE — 85025 COMPLETE CBC W/AUTO DIFF WBC: CPT | Performed by: ORTHOPAEDIC SURGERY

## 2022-04-25 PROCEDURE — 85610 PROTHROMBIN TIME: CPT | Performed by: ORTHOPAEDIC SURGERY

## 2022-04-25 PROCEDURE — 93010 ELECTROCARDIOGRAM REPORT: CPT | Performed by: INTERNAL MEDICINE

## 2022-04-25 RX ORDER — SENNOSIDES 8.6 MG
650 CAPSULE ORAL EVERY 8 HOURS PRN
COMMUNITY

## 2022-04-25 RX ORDER — SODIUM BICARBONATE 325 MG/1
325 TABLET ORAL 3 TIMES DAILY
COMMUNITY

## 2022-04-25 RX ORDER — PRAVASTATIN SODIUM 40 MG
40 TABLET ORAL DAILY
COMMUNITY
Start: 2022-03-29

## 2022-04-25 ASSESSMENT — HOOS JR
HOOS JR SCORE: 14
HOOS JR SCORE: 46.652

## 2022-04-25 NOTE — DISCHARGE INSTRUCTIONS
IMPORTANT INSTRUCTIONS - PRE-ADMISSION TESTING  DO NOT EAT OR CHEW anything after midnight the night before your procedure.    You may have CLEAR liquids up to ___3___ hours prior to ARRIVAL time.   Take the following medications the morning of your procedure with JUST A SIP OF WATER:  Tylenol, Protonix,   DO NOT BRING your medications to the hospital with you, UNLESS something has changed since your PRE-Admission Testing appointment.  Hold all vitamins, supplements, and NSAIDS (Non- steroidal anti-inflammatory meds) for one week prior to surgery (you MAY take Tylenol or Acetaminophen).  If you are diabetic, check your blood sugar the morning of your procedure. If it is less than 70 or if you are feeling symptomatic, call the following number for further instructions: [phone]_.  Make sure you have a ride home and have someone who will stay with you the day of your procedure after you go home.  If you have any questions, please call your Pre-Admission Testing Nurse, ___Milly_____________ at [phone]___________.   Per anesthesia request, do not smoke for 24 hours before your procedure or as instructed by your surgeon.

## 2022-04-26 LAB — QT INTERVAL: 394 MS

## 2022-04-27 ENCOUNTER — ANESTHESIA EVENT (OUTPATIENT)
Dept: PERIOP | Facility: HOSPITAL | Age: 77
End: 2022-04-27

## 2022-05-06 ENCOUNTER — APPOINTMENT (OUTPATIENT)
Dept: GENERAL RADIOLOGY | Facility: HOSPITAL | Age: 77
End: 2022-05-06

## 2022-05-06 ENCOUNTER — ANESTHESIA (OUTPATIENT)
Dept: PERIOP | Facility: HOSPITAL | Age: 77
End: 2022-05-06

## 2022-05-06 ENCOUNTER — HOSPITAL ENCOUNTER (OUTPATIENT)
Facility: HOSPITAL | Age: 77
Discharge: HOME | End: 2022-05-06
Attending: ORTHOPAEDIC SURGERY | Admitting: ORTHOPAEDIC SURGERY

## 2022-05-06 VITALS
WEIGHT: 152.78 LBS | HEART RATE: 72 BPM | BODY MASS INDEX: 25.45 KG/M2 | DIASTOLIC BLOOD PRESSURE: 60 MMHG | TEMPERATURE: 97.7 F | OXYGEN SATURATION: 96 % | SYSTOLIC BLOOD PRESSURE: 112 MMHG | RESPIRATION RATE: 18 BRPM | HEIGHT: 65 IN

## 2022-05-06 DIAGNOSIS — M16.12 PRIMARY OSTEOARTHRITIS OF LEFT HIP: Primary | ICD-10-CM

## 2022-05-06 DIAGNOSIS — R26.2 DIFFICULTY WALKING: ICD-10-CM

## 2022-05-06 DIAGNOSIS — M16.0 PRIMARY OSTEOARTHRITIS OF BOTH HIPS: ICD-10-CM

## 2022-05-06 LAB
HCT VFR BLD AUTO: 33.8 % (ref 34–46.6)
HGB BLD-MCNC: 11.1 G/DL (ref 12–15.9)

## 2022-05-06 PROCEDURE — 25010000002 EPINEPHRINE 1 MG/ML SOLUTION: Performed by: ORTHOPAEDIC SURGERY

## 2022-05-06 PROCEDURE — 25010000002 ROPIVACAINE PER 1 MG: Performed by: ORTHOPAEDIC SURGERY

## 2022-05-06 PROCEDURE — 85014 HEMATOCRIT: CPT | Performed by: ORTHOPAEDIC SURGERY

## 2022-05-06 PROCEDURE — 25010000002 CEFAZOLIN IN DEXTROSE 2-4 GM/100ML-% SOLUTION: Performed by: ORTHOPAEDIC SURGERY

## 2022-05-06 PROCEDURE — 25010000002 FENTANYL CITRATE (PF) 50 MCG/ML SOLUTION: Performed by: NURSE ANESTHETIST, CERTIFIED REGISTERED

## 2022-05-06 PROCEDURE — 25010000002 MORPHINE SULFATE (PF) 10 MG/ML SOLUTION: Performed by: ORTHOPAEDIC SURGERY

## 2022-05-06 PROCEDURE — 27130 TOTAL HIP ARTHROPLASTY: CPT | Performed by: ORTHOPAEDIC SURGERY

## 2022-05-06 PROCEDURE — 97165 OT EVAL LOW COMPLEX 30 MIN: CPT

## 2022-05-06 PROCEDURE — 76000 FLUOROSCOPY <1 HR PHYS/QHP: CPT

## 2022-05-06 PROCEDURE — 73502 X-RAY EXAM HIP UNI 2-3 VIEWS: CPT

## 2022-05-06 PROCEDURE — 25010000002 DEXAMETHASONE PER 1 MG: Performed by: NURSE ANESTHETIST, CERTIFIED REGISTERED

## 2022-05-06 PROCEDURE — 25010000002 ONDANSETRON PER 1 MG: Performed by: NURSE ANESTHETIST, CERTIFIED REGISTERED

## 2022-05-06 PROCEDURE — 25010000002 HYDROMORPHONE 1 MG/ML SOLUTION: Performed by: NURSE ANESTHETIST, CERTIFIED REGISTERED

## 2022-05-06 PROCEDURE — 25010000002 KETOROLAC TROMETHAMINE PER 15 MG: Performed by: ORTHOPAEDIC SURGERY

## 2022-05-06 PROCEDURE — C1776 JOINT DEVICE (IMPLANTABLE): HCPCS | Performed by: ORTHOPAEDIC SURGERY

## 2022-05-06 PROCEDURE — 97110 THERAPEUTIC EXERCISES: CPT

## 2022-05-06 PROCEDURE — 97116 GAIT TRAINING THERAPY: CPT

## 2022-05-06 PROCEDURE — 63710000001 CELECOXIB 100 MG CAPSULE: Performed by: STUDENT IN AN ORGANIZED HEALTH CARE EDUCATION/TRAINING PROGRAM

## 2022-05-06 PROCEDURE — 97535 SELF CARE MNGMENT TRAINING: CPT

## 2022-05-06 PROCEDURE — 97530 THERAPEUTIC ACTIVITIES: CPT

## 2022-05-06 PROCEDURE — A9270 NON-COVERED ITEM OR SERVICE: HCPCS | Performed by: ORTHOPAEDIC SURGERY

## 2022-05-06 PROCEDURE — 85018 HEMOGLOBIN: CPT | Performed by: ORTHOPAEDIC SURGERY

## 2022-05-06 PROCEDURE — 63710000001 ACETAMINOPHEN 500 MG TABLET: Performed by: STUDENT IN AN ORGANIZED HEALTH CARE EDUCATION/TRAINING PROGRAM

## 2022-05-06 PROCEDURE — 63710000001 FAMOTIDINE 20 MG TABLET: Performed by: ORTHOPAEDIC SURGERY

## 2022-05-06 PROCEDURE — A9270 NON-COVERED ITEM OR SERVICE: HCPCS | Performed by: NURSE ANESTHETIST, CERTIFIED REGISTERED

## 2022-05-06 PROCEDURE — 25010000002 PROPOFOL 10 MG/ML EMULSION: Performed by: NURSE ANESTHETIST, CERTIFIED REGISTERED

## 2022-05-06 PROCEDURE — A9270 NON-COVERED ITEM OR SERVICE: HCPCS | Performed by: STUDENT IN AN ORGANIZED HEALTH CARE EDUCATION/TRAINING PROGRAM

## 2022-05-06 PROCEDURE — 63710000001 OXYCODONE 5 MG TABLET: Performed by: NURSE ANESTHETIST, CERTIFIED REGISTERED

## 2022-05-06 PROCEDURE — 63710000001 ACETAMINOPHEN 500 MG TABLET: Performed by: ORTHOPAEDIC SURGERY

## 2022-05-06 PROCEDURE — 97161 PT EVAL LOW COMPLEX 20 MIN: CPT

## 2022-05-06 DEVICE — LINER ACET G7 VIVACIT/E NTRL HXPE SZD 36MM: Type: IMPLANTABLE DEVICE | Site: HIP | Status: FUNCTIONAL

## 2022-05-06 DEVICE — SHLL ACET G7 PPS LTD/HL TI SZC 50MM: Type: IMPLANTABLE DEVICE | Site: HIP | Status: FUNCTIONAL

## 2022-05-06 DEVICE — BIOLOX® DELTA, CERAMIC FEMORAL HEAD, M, Ø 36/0, TAPER 12/14
Type: IMPLANTABLE DEVICE | Site: HIP | Status: FUNCTIONAL
Brand: BIOLOX® DELTA

## 2022-05-06 DEVICE — STEM FEM/HIP AVENIR/COMPLETE STD/OFFST HA SZ4: Type: IMPLANTABLE DEVICE | Site: HIP | Status: FUNCTIONAL

## 2022-05-06 DEVICE — TOTAL HIP PRIMARY: Type: IMPLANTABLE DEVICE | Site: HIP | Status: FUNCTIONAL

## 2022-05-06 DEVICE — SCRW ACET CORT TRILOGY S/TAP 6.5X30: Type: IMPLANTABLE DEVICE | Site: HIP | Status: FUNCTIONAL

## 2022-05-06 RX ORDER — SODIUM CHLORIDE 0.9 % (FLUSH) 0.9 %
3 SYRINGE (ML) INJECTION EVERY 12 HOURS SCHEDULED
Status: DISCONTINUED | OUTPATIENT
Start: 2022-05-06 | End: 2022-05-06 | Stop reason: HOSPADM

## 2022-05-06 RX ORDER — HYDROCODONE BITARTRATE AND ACETAMINOPHEN 7.5; 325 MG/1; MG/1
1 TABLET ORAL EVERY 4 HOURS PRN
Qty: 45 TABLET | Refills: 0 | Status: SHIPPED | OUTPATIENT
Start: 2022-05-06

## 2022-05-06 RX ORDER — PHENYLEPHRINE HCL IN 0.9% NACL 1 MG/10 ML
SYRINGE (ML) INTRAVENOUS AS NEEDED
Status: DISCONTINUED | OUTPATIENT
Start: 2022-05-06 | End: 2022-05-06 | Stop reason: SURG

## 2022-05-06 RX ORDER — OXYCODONE HYDROCHLORIDE AND ACETAMINOPHEN 5; 325 MG/1; MG/1
1 TABLET ORAL EVERY 4 HOURS PRN
Status: DISCONTINUED | OUTPATIENT
Start: 2022-05-06 | End: 2022-05-06 | Stop reason: HOSPADM

## 2022-05-06 RX ORDER — ONDANSETRON 2 MG/ML
4 INJECTION INTRAMUSCULAR; INTRAVENOUS ONCE AS NEEDED
Status: DISCONTINUED | OUTPATIENT
Start: 2022-05-06 | End: 2022-05-06 | Stop reason: HOSPADM

## 2022-05-06 RX ORDER — SUCCINYLCHOLINE/SOD CL,ISO/PF 100 MG/5ML
SYRINGE (ML) INTRAVENOUS AS NEEDED
Status: DISCONTINUED | OUTPATIENT
Start: 2022-05-06 | End: 2022-05-06 | Stop reason: SURG

## 2022-05-06 RX ORDER — CEFAZOLIN SODIUM 2 G/100ML
2 INJECTION, SOLUTION INTRAVENOUS EVERY 8 HOURS
Status: DISCONTINUED | OUTPATIENT
Start: 2022-05-06 | End: 2022-05-06 | Stop reason: HOSPADM

## 2022-05-06 RX ORDER — ASPIRIN 325 MG
325 TABLET, DELAYED RELEASE (ENTERIC COATED) ORAL DAILY
Qty: 21 TABLET | Refills: 0 | Status: SHIPPED | OUTPATIENT
Start: 2022-05-06

## 2022-05-06 RX ORDER — ONDANSETRON 2 MG/ML
INJECTION INTRAMUSCULAR; INTRAVENOUS AS NEEDED
Status: DISCONTINUED | OUTPATIENT
Start: 2022-05-06 | End: 2022-05-06 | Stop reason: SURG

## 2022-05-06 RX ORDER — CEFAZOLIN SODIUM IN 0.9 % NACL 3 G/100 ML
3 INTRAVENOUS SOLUTION, PIGGYBACK (ML) INTRAVENOUS ONCE
Status: DISCONTINUED | OUTPATIENT
Start: 2022-05-06 | End: 2022-05-06

## 2022-05-06 RX ORDER — TRANEXAMIC ACID 10 MG/ML
1000 INJECTION, SOLUTION INTRAVENOUS ONCE
Status: COMPLETED | OUTPATIENT
Start: 2022-05-06 | End: 2022-05-06

## 2022-05-06 RX ORDER — SODIUM CHLORIDE 0.9 % (FLUSH) 0.9 %
10 SYRINGE (ML) INJECTION AS NEEDED
Status: DISCONTINUED | OUTPATIENT
Start: 2022-05-06 | End: 2022-05-06 | Stop reason: HOSPADM

## 2022-05-06 RX ORDER — ACETAMINOPHEN 500 MG
1000 TABLET ORAL ONCE
Status: COMPLETED | OUTPATIENT
Start: 2022-05-06 | End: 2022-05-06

## 2022-05-06 RX ORDER — GLYCOPYRROLATE 0.2 MG/ML
0.2 INJECTION INTRAMUSCULAR; INTRAVENOUS
Status: COMPLETED | OUTPATIENT
Start: 2022-05-06 | End: 2022-05-06

## 2022-05-06 RX ORDER — DEXAMETHASONE SODIUM PHOSPHATE 4 MG/ML
INJECTION, SOLUTION INTRA-ARTICULAR; INTRALESIONAL; INTRAMUSCULAR; INTRAVENOUS; SOFT TISSUE AS NEEDED
Status: DISCONTINUED | OUTPATIENT
Start: 2022-05-06 | End: 2022-05-06 | Stop reason: SURG

## 2022-05-06 RX ORDER — CELECOXIB 100 MG/1
200 CAPSULE ORAL ONCE
Status: COMPLETED | OUTPATIENT
Start: 2022-05-06 | End: 2022-05-06

## 2022-05-06 RX ORDER — OXYCODONE HYDROCHLORIDE AND ACETAMINOPHEN 5; 325 MG/1; MG/1
2 TABLET ORAL EVERY 4 HOURS PRN
Status: DISCONTINUED | OUTPATIENT
Start: 2022-05-06 | End: 2022-05-06 | Stop reason: HOSPADM

## 2022-05-06 RX ORDER — FENTANYL CITRATE 50 UG/ML
INJECTION, SOLUTION INTRAMUSCULAR; INTRAVENOUS AS NEEDED
Status: DISCONTINUED | OUTPATIENT
Start: 2022-05-06 | End: 2022-05-06 | Stop reason: SURG

## 2022-05-06 RX ORDER — KETOROLAC TROMETHAMINE 30 MG/ML
15 INJECTION, SOLUTION INTRAMUSCULAR; INTRAVENOUS EVERY 6 HOURS
Status: DISCONTINUED | OUTPATIENT
Start: 2022-05-06 | End: 2022-05-06 | Stop reason: HOSPADM

## 2022-05-06 RX ORDER — PROMETHAZINE HYDROCHLORIDE 12.5 MG/1
25 TABLET ORAL ONCE AS NEEDED
Status: DISCONTINUED | OUTPATIENT
Start: 2022-05-06 | End: 2022-05-06 | Stop reason: HOSPADM

## 2022-05-06 RX ORDER — KETAMINE HYDROCHLORIDE 50 MG/ML
INJECTION, SOLUTION, CONCENTRATE INTRAMUSCULAR; INTRAVENOUS AS NEEDED
Status: DISCONTINUED | OUTPATIENT
Start: 2022-05-06 | End: 2022-05-06 | Stop reason: SURG

## 2022-05-06 RX ORDER — AMOXICILLIN 250 MG
2 CAPSULE ORAL 2 TIMES DAILY PRN
Status: DISCONTINUED | OUTPATIENT
Start: 2022-05-06 | End: 2022-05-06 | Stop reason: HOSPADM

## 2022-05-06 RX ORDER — CEFAZOLIN SODIUM 2 G/100ML
2 INJECTION, SOLUTION INTRAVENOUS ONCE
Status: COMPLETED | OUTPATIENT
Start: 2022-05-06 | End: 2022-05-06

## 2022-05-06 RX ORDER — SODIUM CHLORIDE, SODIUM LACTATE, POTASSIUM CHLORIDE, CALCIUM CHLORIDE 600; 310; 30; 20 MG/100ML; MG/100ML; MG/100ML; MG/100ML
9 INJECTION, SOLUTION INTRAVENOUS CONTINUOUS PRN
Status: DISCONTINUED | OUTPATIENT
Start: 2022-05-06 | End: 2022-05-06 | Stop reason: HOSPADM

## 2022-05-06 RX ORDER — FAMOTIDINE 20 MG/1
40 TABLET, FILM COATED ORAL DAILY
Status: DISCONTINUED | OUTPATIENT
Start: 2022-05-06 | End: 2022-05-06 | Stop reason: HOSPADM

## 2022-05-06 RX ORDER — ONDANSETRON 4 MG/1
4 TABLET, FILM COATED ORAL EVERY 6 HOURS PRN
Status: DISCONTINUED | OUTPATIENT
Start: 2022-05-06 | End: 2022-05-06 | Stop reason: HOSPADM

## 2022-05-06 RX ORDER — OXYCODONE HYDROCHLORIDE 5 MG/1
5 TABLET ORAL
Status: COMPLETED | OUTPATIENT
Start: 2022-05-06 | End: 2022-05-06

## 2022-05-06 RX ORDER — LIDOCAINE HYDROCHLORIDE 20 MG/ML
INJECTION, SOLUTION EPIDURAL; INFILTRATION; INTRACAUDAL; PERINEURAL AS NEEDED
Status: DISCONTINUED | OUTPATIENT
Start: 2022-05-06 | End: 2022-05-06 | Stop reason: SURG

## 2022-05-06 RX ORDER — PROMETHAZINE HYDROCHLORIDE 25 MG/1
25 SUPPOSITORY RECTAL ONCE AS NEEDED
Status: DISCONTINUED | OUTPATIENT
Start: 2022-05-06 | End: 2022-05-06 | Stop reason: HOSPADM

## 2022-05-06 RX ORDER — PROMETHAZINE HYDROCHLORIDE 12.5 MG/1
12.5 TABLET ORAL EVERY 6 HOURS PRN
Status: DISCONTINUED | OUTPATIENT
Start: 2022-05-06 | End: 2022-05-06 | Stop reason: HOSPADM

## 2022-05-06 RX ORDER — PROPOFOL 10 MG/ML
VIAL (ML) INTRAVENOUS AS NEEDED
Status: DISCONTINUED | OUTPATIENT
Start: 2022-05-06 | End: 2022-05-06 | Stop reason: SURG

## 2022-05-06 RX ORDER — SODIUM CHLORIDE, SODIUM LACTATE, POTASSIUM CHLORIDE, CALCIUM CHLORIDE 600; 310; 30; 20 MG/100ML; MG/100ML; MG/100ML; MG/100ML
100 INJECTION, SOLUTION INTRAVENOUS CONTINUOUS
Status: DISCONTINUED | OUTPATIENT
Start: 2022-05-06 | End: 2022-05-06 | Stop reason: HOSPADM

## 2022-05-06 RX ORDER — PROMETHAZINE HYDROCHLORIDE 12.5 MG/1
12.5 SUPPOSITORY RECTAL EVERY 6 HOURS PRN
Status: DISCONTINUED | OUTPATIENT
Start: 2022-05-06 | End: 2022-05-06 | Stop reason: HOSPADM

## 2022-05-06 RX ORDER — ONDANSETRON 2 MG/ML
4 INJECTION INTRAMUSCULAR; INTRAVENOUS EVERY 6 HOURS PRN
Status: DISCONTINUED | OUTPATIENT
Start: 2022-05-06 | End: 2022-05-06 | Stop reason: HOSPADM

## 2022-05-06 RX ORDER — ROCURONIUM BROMIDE 10 MG/ML
INJECTION, SOLUTION INTRAVENOUS AS NEEDED
Status: DISCONTINUED | OUTPATIENT
Start: 2022-05-06 | End: 2022-05-06 | Stop reason: SURG

## 2022-05-06 RX ORDER — ACETAMINOPHEN 325 MG/1
325 TABLET ORAL EVERY 4 HOURS PRN
Status: DISCONTINUED | OUTPATIENT
Start: 2022-05-06 | End: 2022-05-06 | Stop reason: HOSPADM

## 2022-05-06 RX ORDER — ACETAMINOPHEN 500 MG
1000 TABLET ORAL EVERY 8 HOURS
Status: DISCONTINUED | OUTPATIENT
Start: 2022-05-06 | End: 2022-05-06 | Stop reason: HOSPADM

## 2022-05-06 RX ADMIN — GLYCOPYRROLATE 0.2 MG: 0.2 INJECTION INTRAMUSCULAR; INTRAVENOUS at 10:09

## 2022-05-06 RX ADMIN — CEFAZOLIN SODIUM 2 G: 2 INJECTION, SOLUTION INTRAVENOUS at 18:05

## 2022-05-06 RX ADMIN — CEFAZOLIN SODIUM 2 G: 2 INJECTION, SOLUTION INTRAVENOUS at 10:23

## 2022-05-06 RX ADMIN — TRANEXAMIC ACID 1000 MG: 10 INJECTION, SOLUTION INTRAVENOUS at 10:11

## 2022-05-06 RX ADMIN — ACETAMINOPHEN 1000 MG: 500 TABLET ORAL at 09:09

## 2022-05-06 RX ADMIN — CELECOXIB 200 MG: 100 CAPSULE ORAL at 09:09

## 2022-05-06 RX ADMIN — OXYCODONE HYDROCHLORIDE 5 MG: 5 TABLET ORAL at 12:22

## 2022-05-06 RX ADMIN — DEXAMETHASONE SODIUM PHOSPHATE 4 MG: 4 INJECTION, SOLUTION INTRA-ARTICULAR; INTRALESIONAL; INTRAMUSCULAR; INTRAVENOUS; SOFT TISSUE at 10:42

## 2022-05-06 RX ADMIN — FAMOTIDINE 40 MG: 20 TABLET ORAL at 16:45

## 2022-05-06 RX ADMIN — FENTANYL CITRATE 100 MCG: 50 INJECTION, SOLUTION INTRAMUSCULAR; INTRAVENOUS at 10:27

## 2022-05-06 RX ADMIN — Medication 100 MCG: at 08:57

## 2022-05-06 RX ADMIN — ACETAMINOPHEN 1000 MG: 500 TABLET ORAL at 16:45

## 2022-05-06 RX ADMIN — OXYCODONE HYDROCHLORIDE 5 MG: 5 TABLET ORAL at 12:00

## 2022-05-06 RX ADMIN — Medication 100 MCG: at 11:04

## 2022-05-06 RX ADMIN — SODIUM CHLORIDE, POTASSIUM CHLORIDE, SODIUM LACTATE AND CALCIUM CHLORIDE 9 ML/HR: 600; 310; 30; 20 INJECTION, SOLUTION INTRAVENOUS at 09:09

## 2022-05-06 RX ADMIN — LIDOCAINE HYDROCHLORIDE 80 MG: 20 INJECTION, SOLUTION EPIDURAL; INFILTRATION; INTRACAUDAL; PERINEURAL at 10:27

## 2022-05-06 RX ADMIN — Medication 70 MG: at 10:27

## 2022-05-06 RX ADMIN — TRANEXAMIC ACID 1000 MG: 10 INJECTION, SOLUTION INTRAVENOUS at 11:19

## 2022-05-06 RX ADMIN — HYDROMORPHONE HYDROCHLORIDE 0.5 MG: 1 INJECTION, SOLUTION INTRAMUSCULAR; INTRAVENOUS; SUBCUTANEOUS at 11:51

## 2022-05-06 RX ADMIN — PROPOFOL 150 MG: 10 INJECTION, EMULSION INTRAVENOUS at 10:27

## 2022-05-06 RX ADMIN — ONDANSETRON 4 MG: 2 INJECTION INTRAMUSCULAR; INTRAVENOUS at 10:42

## 2022-05-06 RX ADMIN — HYDROMORPHONE HYDROCHLORIDE 0.5 MG: 1 INJECTION, SOLUTION INTRAMUSCULAR; INTRAVENOUS; SUBCUTANEOUS at 12:18

## 2022-05-06 RX ADMIN — Medication 3 ML: at 16:46

## 2022-05-06 RX ADMIN — Medication 100 MCG: at 10:39

## 2022-05-06 RX ADMIN — SUGAMMADEX 140 MG: 100 INJECTION, SOLUTION INTRAVENOUS at 11:32

## 2022-05-06 RX ADMIN — KETAMINE HYDROCHLORIDE 20 MG: 50 INJECTION, SOLUTION INTRAMUSCULAR; INTRAVENOUS at 10:59

## 2022-05-06 RX ADMIN — SODIUM CHLORIDE, POTASSIUM CHLORIDE, SODIUM LACTATE AND CALCIUM CHLORIDE 100 ML/HR: 600; 310; 30; 20 INJECTION, SOLUTION INTRAVENOUS at 16:46

## 2022-05-06 RX ADMIN — HYDROMORPHONE HYDROCHLORIDE 0.5 MG: 1 INJECTION, SOLUTION INTRAMUSCULAR; INTRAVENOUS; SUBCUTANEOUS at 11:58

## 2022-05-06 RX ADMIN — KETOROLAC TROMETHAMINE 15 MG: 30 INJECTION, SOLUTION INTRAMUSCULAR; INTRAVENOUS at 16:44

## 2022-05-06 RX ADMIN — Medication 200 MCG: at 10:45

## 2022-05-06 RX ADMIN — ROCURONIUM BROMIDE 10 MG: 10 INJECTION INTRAVENOUS at 10:27

## 2022-05-06 RX ADMIN — Medication 200 MCG: at 11:25

## 2022-05-06 RX ADMIN — ROCURONIUM BROMIDE 40 MG: 10 INJECTION INTRAVENOUS at 10:40

## 2022-05-06 NOTE — OP NOTE
TOTAL HIP ARTHROPLASTY ANTERIOR  Procedure Report    Patient Name:  Maegan Madera  YOB: 1945    Date of Surgery:  5/6/2022       Pre-op Diagnosis:   Primary osteoarthritis left hip  Primary osteoarthritis of both hips [M16.0]       Post-Op Diagnosis Codes:     * Primary osteoarthritis of both hips [M16.0]    Procedure/CPT® Codes:      Procedure(s):  Left TOTAL HIP ARTHROPLASTY ANTERIOR    Staff:  Surgeon(s):  Zane Curry MD    Assistant: Willis Lee RN; Mayela Mcelroy    Anesthesia: General    Estimated Blood Loss: 250 mL    Implants:    Implant Name Type Inv. Item Serial No.  Lot No. LRB No. Used Action   SHLL ACET G7 PPS LTD/HL SZC 50MM - [id number] Implant SHLL ACET G7 PPS LTD/HL SZC 50MM  [employer] [id number] Left 1 Implanted   SCRW ACET ANSELMO TRILOGY S/TAP 6.5X30 - [id number] Implant SCRW ACET ANSELMO TRILOGY S/TAP 6.5X30  [employer] [id number] Left 1 Implanted   LINER ACET G7 VIVACIT/E NTRL HXPE SZD 36MM - [id number] Implant LINER ACET G7 VIVACIT/E NTRL HXPE SZD 36MM  [employer] [id number] Left 1 Implanted   STEM FEM/HIP AVENIR/COMPLETE STD/OFFST HA SZ4 - [id number] Implant STEM FEM/HIP AVENIR/COMPLETE STD/OFFST HA SZ4  [employer] [id number] Left 1 Implanted   HD FEM/HIP BIOLOX/DELTA CERAM 12/91J08LD PLS0MM - [id number] Implant HD FEM/HIP BIOLOX/DELTA CERAM 12/14R05UV PLS0MM  [employer] [id number] Left 1 Implanted       Specimen:          None      Complications: None    Description of Procedure: See H&P for risks and benefits. The patient was taken to the operating room and placed supine on the [place] table after general endotracheal anesthesia established. The patient was placed in appropriate position for an anterior approach to the hip. The left hip and lower extremity were prepped and draped in standard fashion using alcohol and ChloraPrep.  Standard 4 inch incision was made starting lateral to the anterior and inferior to the anterior superior spine over the tensor  musculature. Dissection was carried down through the skin and a Bovie was used to dissect down to the fascial layer which was then incised in line with the incision.  Blunt dissection was taken down to place the extracapsular retractor over the superior neck.  The cobra was used to elevate the rectus off the anterior hip capsule and another cobra was placed extracapsularly over the inferior femoral neck.   The inferior vessels were ligated with an Aquamantys and Bovie cautery, and the fascial layer over the vastus was released.  L-shaped capsulotomy was carried out in standard fashion placing FiberWire and suture in both limbs of the capsule and retractors placed anterior capsularly. The saddle region of the femur was dissected with Bovie as well as medial femoral neck.  The femoral neck cut was made using oscillating saw according to preoperative templating and the head was removed and then acetabular retractors were placed, labrum was removed along with ligamentum teres, and under C-arm view acetabulum was sequentially reamed  to accommodate the appropriate size acetabular shell which was inserted under C-arm guidance. Polyethylene liner was placed and pigtail retractor was used in proximal femur and the leg was brought in extension external rotation and adduction. Proximal femur was lateralized using a chili pepper and rat tail grasp and the canal was sequentially broached to accommodate the appropriate size femoral trial. The trial was undertaken and found to be satisfactory, stable posteriorly and anteriorly and appropriate leg lengths was assessed.  The trials were removed and after copious irrigation and drying the canal the real femoral stem was placed in the same version as the trial. The trials were again undertaken. C-arm shots were taken to both sides measuring offset as well as length and appropriate head was chosen and implanted. The hip was relocated and stable posterior anterior appear to have equal  leg lengths.  The wound was copiously irrigated and capsular layer was closed with previously placed suture.  More copious irrigation followed and the fascial layer was closed with 0 Vicryl. Deep fat was closed with 0 Vicryl. I injected the anterior hip capsule, the tensor fascia, and the subcutaneous fat with a combination of ropivacaine, morphine, and Toradol mixture.  The subcutaneous was closed with 2-0 Vicryl and skin was closed with staples.  The incision was washed and dried and sterile dressing applied. The patient tolerated the procedure well. The patient was extubated and taken to recovery room.      Zane Curry MD     Date: 5/6/2022  Time: 12:37 EDT

## 2022-05-06 NOTE — THERAPY EVALUATION
Acute Care - Physical Therapy Initial Evaluation  MAIKEL Lema     Patient Name: Maegan RAMIREZ Post  : 1945  MRN: [id number]  Today's Date: 2022      Visit Dx: Admit date: 2022     Referring Physician: Zane Curry MD     Surgery Date:2022   Procedure(s) (LRB):  TOTAL HIP ARTHROPLASTY ANTERIOR (Left)         ICD-10-CM ICD-9-CM   1. Primary osteoarthritis of left hip  M16.12 715.15   2. Primary osteoarthritis of both hips  M16.0 715.15   3. Difficulty walking  R26.2 719.7     Patient Active Problem List   Diagnosis   • Primary osteoarthritis of left hip   • Primary osteoarthritis of both hips     Past Medical History:   Diagnosis Date   • Arthritis of right hip    • GERD (gastroesophageal reflux disease)    • History of chest pain     , patient states ruled gerd. Follows up with [facility] once yearly. No recent chest pain, SOA   • Hyperlipidemia      Past Surgical History:   Procedure Laterality Date   • ANKLE SURGERY Right    • HAND SURGERY Left      PT Assessment (last 12 hours)     PT Evaluation and Treatment     Row Name 22 1328          Physical Therapy Time and Intention    Subjective Information no complaints  -DP     Document Type evaluation  -DP     Mode of Treatment individual therapy;physical therapy  -DP     Patient Effort excellent  -DP     Row Name 22 1328          General Information    Patient Profile Reviewed yes  -DP     Patient Observations cooperative;alert;agree to therapy  -DP     Prior Level of Function independent:;gait;transfer;bed mobility;ADL's  -DP     Equipment Currently Used at Home none  -DP     Existing Precautions/Restrictions no known precautions/restrictions  -DP     Row Name 22 1328          Living Environment    Current Living Arrangements home  -DP     Home Accessibility stairs to enter home  -DP     People in Home child(jeanna), adult  daughter and son in law  -DP     Row Name 22 1328          Home Main Entrance    Number of Stairs, Main  Entrance two  -DP     Row Name 05/06/22 1328          Range of Motion (ROM)    Range of Motion bilateral lower extremities;ROM is WFL  -DP     Row Name 05/06/22 1328          Strength (Manual Muscle Testing)    Strength (Manual Muscle Testing) bilateral lower extremities;strength is WFL  except L hip tested as 4/5  -DP     Row Name 05/06/22 1328          Mobility    Extremity Weight-bearing Status left lower extremity  -DP     Left Lower Extremity (Weight-bearing Status) weight-bearing as tolerated (WBAT)  -DP     Row Name 05/06/22 1328          Bed Mobility    Bed Mobility supine-sit-supine  -DP     Supine-Sit-Supine [place] (Bed Mobility) contact guard  -DP     Row Name 05/06/22 1328          Transfers    Transfers sit-stand transfer  -DP     Sit-Stand [place] (Transfers) standby assist  -DP     Row Name 05/06/22 1328          Gait/Stairs (Locomotion)    Gait/Stairs Locomotion gait/ambulation assistive device  -DP     [place] Level (Gait) standby assist  -DP     Assistive Device (Gait) walker, front-wheeled  -DP     Distance in Feet (Gait) 80  80(eval) + 220(tx)  -DP     Pattern (Gait) swing-through;step-through;4-point  -DP     Comment, (Gait/Stairs) Patient was educated on safe car transfer, stair training, and transfer in and out of the bed with the help of leg   -DP     Row Name 05/06/22 1328          Balance    Balance Assessment standing dynamic balance  -DP     Dynamic Standing Balance standby assist  -DP     Row Name             Wound 05/06/22 1052 Left anterior hip Incision    Wound - Properties Group Placement Date: 05/06/22  -HG Placement Time: 1052  -HG Present on Hospital Admission: N  -HG Side: Left  -HG Orientation: anterior  -HG Location: hip  -HG Primary Wound Type: Incision  -HG Additional Comments: surgical site  -HG     Retired Wound - Properties Group Placement Date: 05/06/22  -HG Placement Time: 1052  -HG Present on Hospital Admission: N  -HG Side: Left  -HG Orientation:  anterior  -HG Location: hip  -HG Primary Wound Type: Incision  -HG Additional Comments: surgical site  -HG     Retired Wound - Properties Group Date first assessed: 05/06/22  -HG Time first assessed: 1052  -HG Present on Hospital Admission: N  -HG Side: Left  -HG Location: hip  -HG Primary Wound Type: Incision  -HG Additional Comments: surgical site  -HG     Row Name 05/06/22 1328          Plan of Care Review    Plan of Care Reviewed With patient  -DP     Outcome Evaluation Pt presents with minimal limitations with strength and functional mobility.  She will benefit from skilled physical therapy services and outpatient PT services for rehab status post total hip replacement.  -DP     Row Name 05/06/22 1328          Therapy Assessment/Plan (PT)    Rehab Potential (PT) good, to achieve stated therapy goals  -DP     Criteria for Skilled Interventions Met (PT) yes;meets criteria  -DP     Therapy Frequency (PT) evaluation only  -DP     Problem List (PT) problems related to;mobility  -DP     Activity Limitations Related to Problem List (PT) unable to ambulate safely;unable to transfer safely  -DP     Row Name 05/06/22 1328          PT Evaluation Complexity    History, PT Evaluation Complexity no personal factors and/or comorbidities  -DP     Examination of Body Systems (PT Eval Complexity) total of 4 or more elements  -DP     Clinical Presentation (PT Evaluation Complexity) stable  -DP     Clinical Decision Making (PT Evaluation Complexity) low complexity  -DP     Overall Complexity (PT Evaluation Complexity) low complexity  -DP           User Key  (r) = Recorded By, (t) = Taken By, (c) = Cosigned By    Initials Name Provider Type    HG Trent Hinojosa RN Registered Nurse    Viki Love, PT Physical Therapist               Left hip Ther-ex   Exercise  Reps  Sets    Long arc Quads   10 2   Hip flexion in sitting 10 2   Heel Slides  10 2   Ankle Pumps  10 2   Quad sets  10 2   Glut sets  10 2   Hip abduction 10 2         Pt was educated on Home Exercise Program.    PT Recommendation and Plan  Planned Therapy Interventions (PT): balance training, bed mobility training, gait training, strengthening, transfer training  Therapy Frequency (PT): evaluation only  Plan of Care Reviewed With: patient  Outcome Evaluation: Pt presents with minimal limitations with strength and functional mobility.  She will benefit from skilled physical therapy services and outpatient PT services for rehab status post total hip replacement.   Outcome Measures     Row Name 05/06/22 1300             How much help from another person do you currently need...    Turning from your back to your side while in flat bed without using bedrails? 4  -DP      Moving from lying on back to sitting on the side of a flat bed without bedrails? 3  -DP      Moving to and from a bed to a chair (including a wheelchair)? 3  -DP      Standing up from a chair using your arms (e.g., wheelchair, bedside chair)? 3  -DP      Climbing 3-5 steps with a railing? 3  -DP      To walk in hospital room? 3  -DP      AM-PAC 6 Clicks Score (PT) 19  -DP              Functional Assessment    Outcome Measure Options AM-PAC 6 Clicks Basic Mobility (PT)  -DP            User Key  (r) = Recorded By, (t) = Taken By, (c) = Cosigned By    Initials Name Provider Type    Viki Love PT Physical Therapist                 Time Calculation:    PT Charges     Row Name 05/06/22 1358             Time Calculation    PT Received On 05/06/22  -DP              Timed Charges    82916 - PT Therapeutic Exercise Minutes 15  -DP      88062 - Gait Training Minutes  12  -DP              Untimed Charges    PT Eval/Re-eval Minutes 40  -DP              Total Minutes    Timed Charges Total Minutes 27  -DP      Untimed Charges Total Minutes 40  -DP       Total Minutes 67  -DP            User Key  (r) = Recorded By, (t) = Taken By, (c) = Cosigned By    Initials Name Provider Type    Viki Love PT Physical  Therapist              Therapy Charges for Today     Code Description Service Date Service Provider Modifiers Qty    48327059168 HC PT EVAL LOW COMPLEXITY 3 5/6/2022 Viki Garcia, PT GP 1    06395843718 HC PT THER PROC EA 15 MIN 5/6/2022 Viki Garcia, PT GP 1    13397887402 HC GAIT TRAINING EA 15 MIN 5/6/2022 Viki Garcia, PT GP 1          PT G-Codes  Outcome Measure Options: AM-PAC 6 Clicks Basic Mobility (PT)  AM-PAC 6 Clicks Score (PT): 19    Viki Garcia, PT  5/6/2022

## 2022-05-06 NOTE — THERAPY EVALUATION
Patient Name: Maegan RAMIREZ Post  : 1945    MRN: [id number]                              Today's Date: 2022       Admit Date: 2022    Visit Dx:     ICD-10-CM ICD-9-CM   1. Primary osteoarthritis of left hip  M16.12 715.15   2. Primary osteoarthritis of both hips  M16.0 715.15   3. Difficulty walking  R26.2 719.7     Patient Active Problem List   Diagnosis   • Primary osteoarthritis of left hip   • Primary osteoarthritis of both hips     Past Medical History:   Diagnosis Date   • Arthritis of right hip    • GERD (gastroesophageal reflux disease)    • History of chest pain     , patient states ruled gerd. Follows up with [facility] once yearly. No recent chest pain, SOA   • Hyperlipidemia      Past Surgical History:   Procedure Laterality Date   • ANKLE SURGERY Right    • HAND SURGERY Left       General Information     Row Name 22 1424 22 1410       OT Time and Intention    Document Type therapy note (daily note)  - evaluation  -    Mode of Treatment individual therapy;occupational therapy  -LF individual therapy;occupational therapy  -    Row Name 22 1424 22 1410       General Information    Patient Profile Reviewed -- yes  -LF    Prior Level of Function -- --  Independent with ADLs, ambulated without a device, has a walk-in shower with shower chair/handheld shower head, e.commode, stands to groom, drives, and no home O2.  -    Existing Precautions/Restrictions weight bearing  LLE WBAT  -LF weight bearing  LLE WBAT  -LF    Barriers to Rehab -- none identified  -    Row Name 22 1410          Occupational Profile    Reason for Services/Referral (Occupational Profile) Patient is currently status post left total hip replacement with anterior approach on May 6th, 2022. Occupational therapy consulted due to recent decline in ADLs/functional transfers. No previous occupational therapy services for current condition.  -     Row Name 22 1410          Living  Environment    People in Home child(jeanna), adult  -     Row Name 05/06/22 1410          Home Main Entrance    Number of Stairs, Main Entrance two  -LF     Stair Railings, Main Entrance railing on right side (ascending)  -     Row Name 05/06/22 1410          Cognition    Orientation Status (Cognition) oriented x 3  -     Row Name 05/06/22 1410          Safety Issues, Functional Mobility    Impairments Affecting Function (Mobility) balance  -           User Key  (r) = Recorded By, (t) = Taken By, (c) = Cosigned By    Initials Name Provider Type     Gisela Burrows OT Occupational Therapist                 Mobility/ADL's     Row Name 05/06/22 1424 05/06/22 1419       Bed Mobility    Comment, (Bed Mobility) Patient upright and seated in recliner upon therapist's arrival.  - Patient upright and seated in recliner upon therapist's arrival.  -    Row Name 05/06/22 1424 05/06/22 1419       Transfers    Transfers sit-stand transfer;stand-sit transfer  -LF sit-stand transfer;stand-sit transfer  -    Sit-Stand Bond (Transfers) standby assist  - standby assist  -    Stand-Sit Bond (Transfers) standby assist  - standby assist  -    Row Name 05/06/22 1424 05/06/22 1419       Sit-Stand Transfer    Assistive Device (Sit-Stand Transfers) walker, front-wheeled  -LF walker, front-wheeled  -LF    Row Name 05/06/22 1424 05/06/22 1419       Stand-Sit Transfer    Assistive Device (Stand-Sit Transfers) walker, front-wheeled  -LF walker, front-wheeled  -LF    Row Name 05/06/22 1424 05/06/22 1419       Activities of Daily Living    BADL Assessment/Intervention upper body dressing;lower body dressing  -LF bathing;upper body dressing;lower body dressing;grooming;feeding;toileting  -    Row Name 05/06/22 1424 05/06/22 1419       Mobility    Extremity Weight-bearing Status left lower extremity  -LF left lower extremity  -LF    Left Lower Extremity (Weight-bearing Status) weight-bearing as tolerated  (WBAT)  - weight-bearing as tolerated (WBAT)  -    Row Name 05/06/22 1419          Bathing Assessment/Intervention    Martin Level (Bathing) bathing skills;upper body;set up;lower body;contact guard assist  -     Row Name 05/06/22 1424 05/06/22 1419       Upper Body Dressing Assessment/Training    Martin Level (Upper Body Dressing) upper body dressing skills;don;bra/undergarment;pull-over garment;set up  - upper body dressing skills;set up  -LF    Position (Upper Body Dressing) unsupported sitting  -LF --    Row Name 05/06/22 1424 05/06/22 1419       Lower Body Dressing Assessment/Training    Martin Level (Lower Body Dressing) lower body dressing skills;doff;socks;don;pants/bottoms;shoes/slippers;contact guard assist;minimum assist (75% patient effort)  - lower body dressing skills;contact guard assist;minimum assist (75% patient effort)  -    Position (Lower Body Dressing) unsupported sitting;supported standing  - --    Comment, (Lower Body Dressing) Educated patient on lower body adaptive dressing technique, verified learning via teachback.  - --    Row Name 05/06/22 1419          Grooming Assessment/Training    Martin Level (Grooming) grooming skills;set up  -     Row Name 05/06/22 1419          Self-Feeding Assessment/Training    Martin Level (Feeding) feeding skills;set up  -     Row Name 05/06/22 1419          Toileting Assessment/Training    Martin Level (Toileting) toileting skills;contact guard assist  -           User Key  (r) = Recorded By, (t) = Taken By, (c) = Cosigned By    Initials Name Provider Type     Gisela Burrows OT Occupational Therapist               Obj/Interventions     Row Name 05/06/22 1420          Sensory Assessment (Somatosensory)    Sensory Assessment (Somatosensory) UE sensation intact  -     Row Name 05/06/22 1420          Vision Assessment/Intervention    Visual Impairment/Limitations WFL;corrective lenses full-time   -LF     Row Name 05/06/22 1420          Range of Motion Comprehensive    General Range of Motion bilateral upper extremity ROM WFL  -LF     Row Name 05/06/22 1420          Strength Comprehensive (MMT)    General Manual Muscle Testing (MMT) Assessment no strength deficits identified  -LF     Row Name 05/06/22 1425 05/06/22 1420       Motor Skills    Motor Skills functional endurance  -LF coordination;functional endurance  -LF    Coordination -- WFL  -LF    Functional Endurance Good for BADLs  -LF Good for BADLs  -LF    Row Name 05/06/22 1425 05/06/22 1420       Balance    Balance Assessment sitting dynamic balance;standing dynamic balance  -LF sitting dynamic balance;standing dynamic balance  -LF    Dynamic Sitting Balance independent  -LF independent  -LF    Position, Sitting Balance unsupported;sitting in chair  -LF unsupported;sitting in chair  -LF    Dynamic Standing Balance standby assist  -LF standby assist  -LF    Position/Device Used, Standing Balance supported;walker, front-wheeled  -LF supported;walker, front-wheeled  -LF    Balance Interventions sitting;standing;sit to stand;supported;dynamic;occupation based/functional task;weight shifting activity  -LF --          User Key  (r) = Recorded By, (t) = Taken By, (c) = Cosigned By    Initials Name Provider Type    LF Gisela Burrows OT Occupational Therapist               Goals/Plan     Row Name 05/06/22 1422          Bed Mobility Goal 1 (OT)    Activity/Assistive Device (Bed Mobility Goal 1, OT) bed mobility activities, all  -LF     Stanly Level/Cues Needed (Bed Mobility Goal 1, OT) modified independence  -LF     Time Frame (Bed Mobility Goal 1, OT) long term goal (LTG);10 days  -LF     Row Name 05/06/22 1422          Transfer Goal 1 (OT)    Activity/Assistive Device (Transfer Goal 1, OT) transfers, all;walker, rolling  -LF     Stanly Level/Cues Needed (Transfer Goal 1, OT) modified independence  -LF     Time Frame (Transfer Goal 1, OT) long  term goal (LTG);10 days  -LF     Row Name 05/06/22 1422          Bathing Goal 1 (OT)    Activity/Device (Bathing Goal 1, OT) bathing skills, all;lower body bathing  -LF     [place] Level/Cues Needed (Bathing Goal 1, OT) modified independence  -LF     Time Frame (Bathing Goal 1, OT) long term goal (LTG);10 days  -LF     Row Name 05/06/22 1422          Dressing Goal 1 (OT)    Activity/Device (Dressing Goal 1, OT) dressing skills, all;lower body dressing  -LF     [place]/Cues Needed (Dressing Goal 1, OT) modified independence  -LF     Time Frame (Dressing Goal 1, OT) long term goal (LTG);10 days  -LF     Row Name 05/06/22 1422          Toileting Goal 1 (OT)    Activity/Device (Toileting Goal 1, OT) toileting skills, all  -LF     [place] Level/Cues Needed (Toileting Goal 1, OT) modified independence  -LF     Time Frame (Toileting Goal 1, OT) long term goal (LTG);10 days  -     Row Name 05/06/22 1422          Therapy Assessment/Plan (OT)    Planned Therapy Interventions (OT) activity tolerance training;patient/caregiver education/training;BADL retraining;functional balance retraining;occupation/activity based interventions;transfer/mobility retraining  -           User Key  (r) = Recorded By, (t) = Taken By, (c) = Cosigned By    Initials Name Provider Type     Gisela Burrows OT Occupational Therapist               Clinical Impression     Row Name 05/06/22 1421          Pain Assessment    Additional Documentation Pain Scale: FACES Pre/Post-Treatment (Group)  -[facility] Name 05/06/22 1421          Pain Scale: FACES Pre/Post-Treatment    Pain: FACES Scale, Pretreatment 2-->hurts little bit  -LF     Posttreatment Pain Rating 2-->hurts little bit  -LF     Pain Location - Side/Orientation Left  -LF     Pain Location - hip  -LF     Row Name 05/06/22 1421          Plan of Care Review    Plan of Care Reviewed With patient  -LF     Progress no change  -LF     Outcome Evaluation Patient presents with  limitations in self-care, functional transfers, and balance. She would benefit from continued skilled occupational therapy services to maximize ADL performance and return home safely and independently.  -     Row Name 05/06/22 1421          Therapy Assessment/Plan (OT)    Patient/Family Therapy Goal Statement (OT) To walk her dogs.  -     Rehab Potential (OT) good, to achieve stated therapy goals  -     Criteria for Skilled Therapeutic Interventions Met (OT) yes;meets criteria;skilled treatment is necessary  -     Therapy Frequency (OT) 5 times/wk  -     Row Name 05/06/22 1421          Therapy Plan Review/Discharge Plan (OT)    Equipment Needs Upon Discharge (OT) commode chair;walker, rolling  -     Anticipated Discharge Disposition (OT) home with outpatient therapy services;home with assist  -     Row Name 05/06/22 1421          Vital Signs    O2 Delivery Pre Treatment room air  -LF     O2 Delivery Intra Treatment room air  -LF     O2 Delivery Post Treatment room air  -LF           User Key  (r) = Recorded By, (t) = Taken By, (c) = Cosigned By    Initials Name Provider Type     Gisela Burrows, OT Occupational Therapist               Outcome Measures     Row Name 05/06/22 1422          How much help from another is currently needed...    Putting on and taking off regular lower body clothing? 3  -LF     Bathing (including washing, rinsing, and drying) 3  -LF     Toileting (which includes using toilet bed pan or urinal) 3  -LF     Putting on and taking off regular upper body clothing 4  -LF     Taking care of personal grooming (such as brushing teeth) 4  -LF     Eating meals 4  -LF     AM-PAC 6 Clicks Score (OT) 21  -LF     Row Name 05/06/22 1300          How much help from another person do you currently need...    Turning from your back to your side while in flat bed without using bedrails? 4  -DP     Moving from lying on back to sitting on the side of a flat bed without bedrails? 3  -DP     Moving  to and from a bed to a chair (including a wheelchair)? 3  -DP     Standing up from a chair using your arms (e.g., wheelchair, bedside chair)? 3  -DP     Climbing 3-5 steps with a railing? 3  -DP     To walk in hospital room? 3  -DP     AM-PAC 6 Clicks Score (PT) 19  -DP     Highest level of mobility 6 --> Walked 10 steps or more  -DP     Row Name 05/06/22 1422 05/06/22 1300       Functional Assessment    Outcome Measure Options AM-PAC 6 Clicks Daily Activity (OT);Optimal Instrument  -LF AM-PAC 6 Clicks Basic Mobility (PT)  -DP    Row Name 05/06/22 1422          Optimal Instrument    Optimal Instrument Optimal - 3  -LF     Bending/Stooping 2  -LF     Standing 2  -LF     Reaching 1  -LF     From the list, choose the 3 activities you would most like to be able to do without any difficulty Bending/stooping;Standing;Reaching  -LF     Total Score Optimal - 3 5  -LF           User Key  (r) = Recorded By, (t) = Taken By, (c) = Cosigned By    Initials Name Provider Type     Gisela Burrows, OT Occupational Therapist    Viki Love, PT Physical Therapist                Occupational Therapy Education                 Title: PT OT SLP Therapies (In Progress)     Topic: Occupational Therapy (In Progress)     Point: ADL training (Done)     Description:   Instruct learner(s) on proper safety adaptation and remediation techniques during self care or transfers.   Instruct in proper use of assistive devices.              Learning Progress Summary           Patient Acceptance, E,TB, VU by  at 5/6/2022 1423                   Point: Home exercise program (Not Started)     Description:   Instruct learner(s) on appropriate technique for monitoring, assisting and/or progressing therapeutic exercises/activities.              Learner Progress:  Not documented in this visit.          Point: Precautions (Done)     Description:   Instruct learner(s) on prescribed precautions during self-care and functional transfers.               Learning Progress Summary           Patient Acceptance, E,TB, VU by  at 5/6/2022 1423                   Point: Body mechanics (Done)     Description:   Instruct learner(s) on proper positioning and spine alignment during self-care, functional mobility activities and/or exercises.              Learning Progress Summary           Patient Acceptance, E,TB, VU by  at 5/6/2022 1423                               User Key     Initials Effective Dates Name Provider Type Discipline     06/16/21 -  Gisela Burrows OT Occupational Therapist OT              OT Recommendation and Plan  Planned Therapy Interventions (OT): activity tolerance training, patient/caregiver education/training, BADL retraining, functional balance retraining, occupation/activity based interventions, transfer/mobility retraining  Therapy Frequency (OT): 5 times/wk  Plan of Care Review  Plan of Care Reviewed With: patient  Progress: no change  Outcome Evaluation: Patient presents with limitations in self-care, functional transfers, and balance. She would benefit from continued skilled occupational therapy services to maximize ADL performance and return home safely and independently.     Time Calculation:    Time Calculation- OT     Row Name 05/06/22 1423 05/06/22 1358          Time Calculation- OT    OT Received On 05/06/22  -LF --     OT Goal Re-Cert Due Date 05/15/22  -LF --            Timed Charges    11429 - Gait Training Minutes  -- 12  -DP     17355 - OT Therapeutic Activity Minutes 10  -LF --     11787 - OT Self Care/Mgmt Minutes 15  -LF --            Untimed Charges    OT Eval/Re-eval Minutes 20  -LF --            Total Minutes    Timed Charges Total Minutes 25  -LF 12  -DP     Untimed Charges Total Minutes 20  -LF --      Total Minutes 45  -LF 12  -DP           User Key  (r) = Recorded By, (t) = Taken By, (c) = Cosigned By    Initials Name Provider Type     Gisela Burrows OT Occupational Therapist    Viki Love, PT Physical  Therapist              Therapy Charges for Today     Code Description Service Date Service Provider Modifiers Qty    35025282056  OT THERAPEUTIC ACT EA 15 MIN 5/6/2022 Gisela Burrows OT GO 1    23614491643 HC OT SELF CARE/MGMT/TRAIN EA 15 MIN 5/6/2022 Gisela Burrows OT GO 1    [id number]  OT EVAL LOW COMPLEXITY 2 5/6/2022 Gisela Burrows OT GO 1               Gisela Burrows OT  5/6/2022

## 2022-05-06 NOTE — H&P
"h and p      Chief Complaint  Initial Evaluation of the Left Hip        Subjective          Maegan Madera presents to [facility] for a follow-up of left hip. Patient states pain posteriorly and in the groin. She states pain radiates down to the knee but she believes it is due to how she is walking. She states no pain in the right hip at this time. She has pain with the limited range of motion in the hip.      No Known Allergies      Social History           Socioeconomic History   • Marital status:    Tobacco Use   • Smoking status: Former Smoker   • Smokeless tobacco: Never Used   Substance and Sexual Activity   • Alcohol use: Never         Review of Systems            Objective      Vital Signs:   Pulse 68   Ht 172.7 cm (68\")   Wt 73.8 kg (162 lb 12.8 oz)   SpO2 96%   BMI 24.75 kg/m²        Physical Exam  Constitutional:       Appearance: Normal appearance. Patient is well-developed and normal weight.   HENT:      Head: Normocephalic.      Right Ear: Hearing and external ear normal.      Left Ear: Hearing and external ear normal.      Nose: Nose normal.   Eyes:      Conjunctiva/sclera: Conjunctivae normal.   Cardiovascular:      Rate and Rhythm: Normal rate.   Pulmonary:      Effort: Pulmonary effort is normal.      Breath sounds: No wheezing or rales.   Abdominal:      Palpations: Abdomen is soft.      Tenderness: There is no abdominal tenderness.   Musculoskeletal:      Cervical back: Normal range of motion.   Skin:     Findings: No rash.   Neurological:      Mental Status: Patient  is alert and oriented to person, place, and time.   Psychiatric:         Mood and Affect: Mood and affect normal.         Judgment: Judgment normal.         Ortho Exam       LEFT HIP: Good tone of hip flexors, hip extensors, hip adductor, hip abductors. Good strength to hamstrings, quadriceps, dorsiflexors and plantar flexors. Calf supple, non-tender, no signs of DVT. Dorsal Pedal Pulse 2+, " posterior tibialis pulse 2+. Limping gait. Tender hip and pelvic muscles. 5 degrees of IR. 120 degrees of flexion. ER 25 degrees. Groin pain with hip motion.         Procedures                    Imaging Results (Most Recent)      Procedure Component Value Units Date/Time     XR Hip With or Without Pelvis 2 - 3 View Left [[id number]] Resulted: 03/02/22 1325       Updated: 03/02/22 1325     Narrative:       X-Ray Report:  Left hip(s) X-Ray  Indication: Evaluation of left hip pain   AP and Lateral view(s)  Findings: Demonstrates severely advanced degenerative changes. No acute   fracture.   Prior studies available for comparison: yes                    Result Review    :         X-Ray Report:  Left hip(s) X-Ray  Indication: Evaluation of left hip pain   AP and Lateral view(s)  Findings: Demonstrates severely advanced degenerative changes. No acute fracture.   Prior studies available for comparison: yes         PROCEDURE:  XR HIP W OR WO PELVIS 2-3 VIEW LEFT     COMPARISON: None     INDICATIONS:  LEFT HIP PAIN X SEVERAL MONTHS/LIMITED ROM/NKI     FINDINGS:          There is severe degenerative change of the left hip.  There is marked joint space narrowing and   bony sclerosis of the subchondral bone of the acetabulum and femoral head.  Subchondral cystic   changes are also noted of the acetabulum and femoral head.  There is moderate osteophyte formation   of the medial aspect of the femoral head.  No bony erosion or abnormal periosteal reaction   identified.  There are severe degenerative changes of the right hip is well with joint space   narrowing and subchondral cystic degenerative change of the right femoral head.  Sacroiliac joints   are within normal limits.  Soft tissues are unremarkable.     CONCLUSION:   1. Severe degenerative changes of both hips, left greater than right.  2. No acute bony abnormality.           Assessment and Plan      DX: Bilateral hip osteoarthritis      Discussed treatment plans and  diagnosis with the patient. She has significant arthritis of bilateral hips however she has no symptoms in the right at this time. She is a candidate for left hip replacement. Risks, benefits and post-operative treatment discussed.     Discussed surgery., Risks/benefits discussed with patient including, but not limited to: infection, bleeding, neurovascular damage, malunion, nonunion, aesthetic deformity, need for further surgery, and death., Discussed with patient the implant type being used during surgery and patient understands and desires to proceed., Surgery pamphlet given. and Call or return if worsening symptoms.     Follow Up      Post-operatively.         Zane Curry MD  05/06/22

## 2022-05-06 NOTE — PLAN OF CARE
Goal Outcome Evaluation:  Plan of Care Reviewed With: patient        Progress: no change  Outcome Evaluation: Patient presents with limitations in self-care, functional transfers, and balance. She would benefit from continued skilled occupational therapy services to maximize ADL performance and return home safely and independently.

## 2022-05-06 NOTE — ANESTHESIA PREPROCEDURE EVALUATION
Anesthesia Evaluation     Patient summary reviewed and Nursing notes reviewed   no history of anesthetic complications:  NPO Solid Status: > 8 hours  NPO Liquid Status: > 2 hours           Airway   Mallampati: II  TM distance: >3 FB  No difficulty expected  Dental    (+) edentulous    Pulmonary - negative pulmonary ROS and normal exam   Cardiovascular - normal exam  Exercise tolerance: good (4-7 METS)    ECG reviewed    (+) dysrhythmias (borderline prolonged QT ), hyperlipidemia,       Neuro/Psych- negative ROS  GI/Hepatic/Renal/Endo    (+)  GERD,      Musculoskeletal     Abdominal  - normal exam   Substance History - negative use     OB/GYN negative ob/gyn ROS         Other   arthritis,                      Anesthesia Plan    ASA 2     general   (Patient understands anesthesia not responsible for dental damage. Patient declined spinal anesthetic.)  intravenous induction     Anesthetic plan, all risks, benefits, and alternatives have been provided, discussed and informed consent has been obtained with: patient.    Plan discussed with CRNA.        CODE STATUS:

## 2022-05-06 NOTE — DISCHARGE INSTRUCTIONS
Dressing Change Instructions:    Remove current dressing on post op day 3 and cleanse with alcohol pad, using a new alcohol pad for each pass over the incision site.  Apply a new Aquacel dressing.    On Post Op Day 6, remove Aquacel dressing, cleanse with alcohol pads and cover with clean dry dressing and secure with paper tape.     Change dry dressings daily.

## 2022-05-06 NOTE — ANESTHESIA POSTPROCEDURE EVALUATION
Patient: Maegan RAMIREZ Post    Procedure Summary     Date: 05/06/22 Room / Location: [facility] OR 03 / [facility] MAIN OR    Anesthesia Start: 1023 Anesthesia Stop: 1148    Procedure: TOTAL HIP ARTHROPLASTY ANTERIOR (Left Hip) Diagnosis:       Primary osteoarthritis of both hips      (Primary osteoarthritis of both hips [M16.0])    Surgeons: Zane Curry MD Provider: Goyo Stanley MD    Anesthesia Type: general ASA Status: 2          Anesthesia Type: general    Vitals  Vitals Value Taken Time   /58 05/06/22 1154   Temp     Pulse 102 05/06/22 1155   Resp     SpO2 100 % 05/06/22 1155   Vitals shown include unvalidated device data.        Post Anesthesia Care and Evaluation    Patient location during evaluation: bedside  Patient participation: complete - patient participated  Level of consciousness: awake  Pain management: adequate  Airway patency: patent  Anesthetic complications: No anesthetic complications  PONV Status: none  Cardiovascular status: acceptable and stable  Respiratory status: acceptable and room air  Hydration status: acceptable    Comments: An Anesthesiologist personally participated in the most demanding procedures (including induction and emergence if applicable) in the anesthesia plan, monitored the course of anesthesia administration at frequent intervals and remained physically present and available for immediate diagnosis and treatment of emergencies.

## 2022-05-06 NOTE — PLAN OF CARE
Goal Outcome Evaluation:  Plan of Care Reviewed With: patient           Outcome Evaluation: Pt presents with minimal limitations with strength and functional mobility.  She will benefit from skilled physical therapy services and outpatient PT services for rehab status post total hip replacement.

## 2022-05-09 ENCOUNTER — TREATMENT (OUTPATIENT)
Dept: PHYSICAL THERAPY | Facility: CLINIC | Age: 77
End: 2022-05-09

## 2022-05-09 DIAGNOSIS — Z47.1 AFTERCARE FOLLOWING LEFT HIP JOINT REPLACEMENT SURGERY: Primary | ICD-10-CM

## 2022-05-09 DIAGNOSIS — R26.2 DIFFICULTY WALKING: ICD-10-CM

## 2022-05-09 DIAGNOSIS — M25.552 LEFT HIP PAIN: ICD-10-CM

## 2022-05-09 DIAGNOSIS — Z96.642 AFTERCARE FOLLOWING LEFT HIP JOINT REPLACEMENT SURGERY: Primary | ICD-10-CM

## 2022-05-09 PROCEDURE — 97110 THERAPEUTIC EXERCISES: CPT | Performed by: PHYSICAL THERAPIST

## 2022-05-09 PROCEDURE — 97161 PT EVAL LOW COMPLEX 20 MIN: CPT | Performed by: PHYSICAL THERAPIST

## 2022-05-09 NOTE — PROGRESS NOTES
Physical Therapy Initial Evaluation and Plan of Care      Patient: Maegan RAMIREZ Post   : 1945  Diagnosis/ICD-10 Code:  Aftercare following left hip joint replacement surgery [Z47.1, Z96.642]  Referring practitioner: Zane Curry MD  Date of Initial Visit: 2022  Today's Date: 2022  Patient seen for 1 sessions           Subjective Questionnaire: LEFS: 32/80=60% limitation      Subjective Evaluation    History of Present Illness  Mechanism of injury: Patient is a 76 yr old female referred to physical therapy s/p L MARGIE 22. Patient ambulating without assistive device.  Patient states may get right hip replacement in 6 months.     Pain  Current pain ratin  Location: Left hip anterior  Quality: dull ache  Relieving factors: change in position, medications, rest and ice    Patient Goals  Patient goals for therapy: decreased pain, increased motion, independence with ADLs/IADLs and increased strength  Patient goal: to return to walking dogs 2 miles a day           Objective          Neurological Testing     Additional Neurological Details  No c/o numbness or tingling    Active Range of Motion   Left Hip   Normal active range of motion    Strength/Myotome Testing     Left Hip   Planes of Motion   Flexion: 4-  Extension: 4-  Abduction: 4-  Adduction: 4    Left Hip Flexibility Comments:   Noted some left hip flexor tightness    Ambulation     Comments   Patient ambulating with slight limp/weakness on left with decrease hip flexion during swing phase. Patient not using an assistive device for gait.          Assessment & Plan     Assessment  Impairments: abnormal gait, activity intolerance, impaired balance, impaired physical strength, lacks appropriate home exercise program and pain with function  Functional Limitations: walking, uncomfortable because of pain, sitting, standing, stooping and unable to perform repetitive tasks  Assessment details: Pt presents with limitations, noted by evaluation that  impede patient's ability to ambulate/perform functional mobility/activity.  The skills of a therapist will be required to safely and effectively implement the following treatment plan to restore maximal level of function.    Prognosis: good    Goals  Plan Goals: 1. The patient complains of left hip pain.   LTG 1: 8weeks:  The patient will report a pain rating of 2/10 or better in order to improve tolerance to activities of daily living and improve sleep quality.   STATUS:  New   STG 1a: 4  weeks:  The patient will report a pain rating of 4/10 or better.   STATUS:  New      2. The patient demonstrates weakness of the left hip.   LTG 2: 8 weeks:  The patient will demonstrate 4+/5 strength for left hip flexion, abduction, and extension in order to improve hip stability.   STATUS:  New   STG 2a: 4 weeks:  The patient will demonstrate 4/5 strength for left hip flexion, abduction, and extension.   STATUS:  New       3. Mobility: Walking/Moving Around Functional Limitation     LTG 3:8 weeks:  The patient will demonstrate 25% limitation by achieving a score of 60/80 on the Lower Extremity Functional Scale.   STATUS:  New   STG 3a: 4 weeks:  The patient will demonstrate 37% limitation by achieving a score of 50/80 on the Lower Extremity Functional Scale.     STATUS:  New     TREATMENT:  Therapeutic exercises, manual therapy, aquatic therapy, home exercise instruction, and modalities as needed for pain to include:  electrical stimulation, moist heat, ice, and  ultrasound      Plan  Therapy options: will be seen for skilled therapy services  Planned modality interventions: cryotherapy  Planned therapy interventions: manual therapy, balance/weight-bearing training, flexibility, functional ROM exercises, gait training, home exercise program, therapeutic activities, stretching, strengthening and soft tissue mobilization  Frequency: 2x week  Duration in weeks: 8  Treatment plan discussed with: patient      History # of Personal  Factors and/or Comorbidities: LOW (0)  Examination of Body System(s): # of elements: LOW (1-2)  Clinical Presentation: STABLE   Clinical Decision Making: LOW       Visit Diagnoses:    ICD-10-CM ICD-9-CM   1. Aftercare following left hip joint replacement surgery  Z47.1 V54.81    Z96.642 V43.64   2. Difficulty walking  R26.2 719.7   3. Left hip pain  M25.552 719.45       Timed:  Manual Therapy:         mins  39586;  Therapeutic Exercise:    10     mins  51421;     Neuromuscular Farhan:        mins  46982;    Therapeutic Activity:          mins  01413;     Gait Training:           mins  36537;     Ultrasound:          mins  59448;    Electrical Stimulation:         mins  97904 ( );    Untimed:  Electrical Stimulation:         mins  97049 ( );  Mechanical Traction:         mins  88858;   PT evaluation   31 mins    Timed Treatment:   10   mins   Total Treatment:     41   mins    PT SIGNATURE: Leta Arredondo PT     Electronically singed 5/9/2022    [place] PT license: [id number]        Initial Certification  Certification Period: 5/9/2022 thru 8/6/2022  I certify that the therapy services are furnished while this patient is under my care.  The services outlined above are required by this patient, and will be reviewed every 90 days.    PHYSICIAN: Zane Curry MD  NPI: [id number]                                      DATE:     Please sign and return via fax to [phone]  Thank you, [facility].

## 2022-05-09 NOTE — THERAPY DISCHARGE NOTE
Acute Care - Occupational Therapy Discharge   Lema     Patient Name: Maegan RAMIREZ Post  : 1945  MRN: [id number]  Today's Date: 2022               Admit Date: 2022       ICD-10-CM ICD-9-CM   1. Primary osteoarthritis of left hip  M16.12 715.15   2. Primary osteoarthritis of both hips  M16.0 715.15   3. Difficulty walking  R26.2 719.7     Patient Active Problem List   Diagnosis   • Primary osteoarthritis of left hip   • Primary osteoarthritis of both hips     Past Medical History:   Diagnosis Date   • Arthritis of right hip    • GERD (gastroesophageal reflux disease)    • History of chest pain     , patient states ruled gerd. Follows up with [facility] once yearly. No recent chest pain, SOA   • Hyperlipidemia      Past Surgical History:   Procedure Laterality Date   • ANKLE SURGERY Right    • HAND SURGERY Left        OT ASSESSMENT FLOWSHEET (last 12 hours)     OT Evaluation and Treatment    No documentation.                 Occupational Therapy Education                 Title: PT OT SLP Therapies (In Progress)     Topic: Occupational Therapy (In Progress)     Point: ADL training (Done)     Description:   Instruct learner(s) on proper safety adaptation and remediation techniques during self care or transfers.   Instruct in proper use of assistive devices.              Learning Progress Summary           Patient Acceptance, E,TB, VU by  at 2022 1423                   Point: Home exercise program (Not Started)     Description:   Instruct learner(s) on appropriate technique for monitoring, assisting and/or progressing therapeutic exercises/activities.              Learner Progress:  Not documented in this visit.          Point: Precautions (Done)     Description:   Instruct learner(s) on prescribed precautions during self-care and functional transfers.              Learning Progress Summary           Patient Acceptance, E,TB, VU by  at 2022 1423                   Point: Body mechanics (Done)      Description:   Instruct learner(s) on proper positioning and spine alignment during self-care, functional mobility activities and/or exercises.              Learning Progress Summary           Patient Acceptance, E,TB, VU by  at 5/6/2022 4243                               User Key     Initials Effective Dates Name Provider Type Discipline     06/16/21 -  Gisela Burrows OT Occupational Therapist OT                OT Recommendation and Plan  Planned Therapy Interventions (OT): activity tolerance training, patient/caregiver education/training, BADL retraining, functional balance retraining, occupation/activity based interventions, transfer/mobility retraining  Therapy Frequency (OT): 5 times/wk  Plan of Care Review  Plan of Care Reviewed With: patient  Progress: no change  Outcome Evaluation: Patient presents with limitations in self-care, functional transfers, and balance. She would benefit from continued skilled occupational therapy services to maximize ADL performance and return home safely and independently.  Plan of Care Reviewed With: patient  Outcome Evaluation: Patient presents with limitations in self-care, functional transfers, and balance. She would benefit from continued skilled occupational therapy services to maximize ADL performance and return home safely and independently.          Outcome Measures     Row Name 05/06/22 1300             How much help from another person do you currently need...    Turning from your back to your side while in flat bed without using bedrails? 4  -DP      Moving from lying on back to sitting on the side of a flat bed without bedrails? 3  -DP      Moving to and from a bed to a chair (including a wheelchair)? 3  -DP      Standing up from a chair using your arms (e.g., wheelchair, bedside chair)? 3  -DP      Climbing 3-5 steps with a railing? 3  -DP      To walk in hospital room? 3  -DP      AM-PAC 6 Clicks Score (PT) 19  -DP              Functional Assessment     Outcome Measure Options AM-PAC 6 Clicks Basic Mobility (PT)  -DP            User Key  (r) = Recorded By, (t) = Taken By, (c) = Cosigned By    Initials Name Provider Type    Viki Love, PT Physical Therapist                Time Calculation:     Timed Therapy Charges  Total Units: 2    Charges  Total Units: 2    Procedure Name Documented Minutes Units Code    HC OT SELF CARE/MGMT/TRAIN EA 15 MIN 15  1    59378 (CPT®)      HC OT THERAPEUTIC ACT EA 15 MIN 10  1    25040 (CPT®)               Documented Minutes  Total Minutes: 25    Therapy Provided Minutes    29646 - OT Self Care/Mgmt Minutes 15    54474 - OT Therapeutic Activity Minutes 10                       OT Discharge Summary  Anticipated Discharge Disposition (OT): home with outpatient therapy services, home with assist  Reason for Discharge: Discharge from facility, Per MD order  Outcomes Achieved: Discharge from facility occurred on same date as evluation  Discharge Destination: Home with outpatient services, Home with assist    Gisela Burrows OT  5/9/2022

## 2022-05-17 ENCOUNTER — TREATMENT (OUTPATIENT)
Dept: PHYSICAL THERAPY | Facility: CLINIC | Age: 77
End: 2022-05-17

## 2022-05-17 DIAGNOSIS — Z47.1 AFTERCARE FOLLOWING LEFT HIP JOINT REPLACEMENT SURGERY: Primary | ICD-10-CM

## 2022-05-17 DIAGNOSIS — R26.2 DIFFICULTY WALKING: ICD-10-CM

## 2022-05-17 DIAGNOSIS — M25.552 LEFT HIP PAIN: ICD-10-CM

## 2022-05-17 DIAGNOSIS — Z96.642 AFTERCARE FOLLOWING LEFT HIP JOINT REPLACEMENT SURGERY: Primary | ICD-10-CM

## 2022-05-17 PROCEDURE — 97140 MANUAL THERAPY 1/> REGIONS: CPT | Performed by: PHYSICAL THERAPIST

## 2022-05-17 PROCEDURE — 97110 THERAPEUTIC EXERCISES: CPT | Performed by: PHYSICAL THERAPIST

## 2022-05-17 NOTE — PROGRESS NOTES
Physical Therapy Daily Treatment Note      Patient: Maegan Madera   : 1945  Referring practitioner: Zane Curry MD  Date of Initial Visit: Type: THERAPY  Noted: 2022  Today's Date: 2022  Patient seen for 2 sessions           Subjective   Maegan Madera reports: left hip pain 3/10      Objective   See Exercise, Manual, and Modality Logs for complete treatment.       Assessment & Plan     Assessment  Prognosis details: Patient tolerated progression of left hip strengthening today; hip extension in standing increased low back discomfort therefore discontinued.         Visit Diagnoses:    ICD-10-CM ICD-9-CM   1. Aftercare following left hip joint replacement surgery  Z47.1 V54.81    Z96.642 V43.64   2. Difficulty walking  R26.2 719.7   3. Left hip pain  M25.552 719.45       Progress per Plan of Care           Timed:  Manual Therapy:    6     mins  73663;  Therapeutic Exercise:    18    mins  70035;     Neuromuscular Farhan:        mins  42048;    Therapeutic Activity:          mins  96851;     Gait Training:           mins  38372;     Ultrasound:          mins  18126;    Electrical Stimulation:         mins  66441 ( );    Untimed:  Electrical Stimulation:         mins  14569 ( );  Mechanical Traction:         mins  37746;     Timed Treatment:   24   mins   Total Treatment:     24   mins  Leta Arredondo PT    Electronically singed 2022      [place] PT license: [id number]  Physical Therapist

## 2022-05-18 ENCOUNTER — OFFICE VISIT (OUTPATIENT)
Dept: ORTHOPEDIC SURGERY | Facility: CLINIC | Age: 77
End: 2022-05-18

## 2022-05-18 VITALS — HEIGHT: 67 IN | WEIGHT: 156 LBS | OXYGEN SATURATION: 94 % | BODY MASS INDEX: 24.48 KG/M2 | HEART RATE: 101 BPM

## 2022-05-18 DIAGNOSIS — Z96.642 AFTERCARE FOLLOWING LEFT HIP JOINT REPLACEMENT SURGERY: ICD-10-CM

## 2022-05-18 DIAGNOSIS — M25.552 LEFT HIP PAIN: Primary | ICD-10-CM

## 2022-05-18 DIAGNOSIS — Z47.1 AFTERCARE FOLLOWING LEFT HIP JOINT REPLACEMENT SURGERY: ICD-10-CM

## 2022-05-18 PROCEDURE — 99024 POSTOP FOLLOW-UP VISIT: CPT | Performed by: PHYSICIAN ASSISTANT

## 2022-05-18 NOTE — PATIENT INSTRUCTIONS
Keep the incision clean and dry. Leave open to air. Continue use of ice and elevation for associated swelling. Continue physical therapy, progress weightbearing status with PT. Call with any changes or concerns.        Follow up in 4 weeks, no imaging.

## 2022-05-18 NOTE — PROGRESS NOTES
"Chief Complaint  Pain and Follow-up of the Left Hip and Suture / Staple Removal    Subjective          Maegan Madera presents to [facility] for s/p left total hip arthroplasty anterior approach performed on 05/06/2022 by Dr. Curry.  Patient has been attending physical therapy at [facility] and [place] 1 time a week.  She is here today fully weightbearing.  She states she has very minimal pain and is very pleased with her progress and outcome thus far.  She denies fever, chills, numbness or tingling.    Objective   No Known Allergies    Vital Signs:   Pulse 101   Ht 170.2 cm (67\")   Wt 70.8 kg (156 lb)   SpO2 94%   BMI 24.43 kg/m²       Physical Exam  Constitutional:       Appearance: Normal appearance. Patient is well-developed and normal weight.   HENT:      Head: Normocephalic.      Right Ear: Hearing and external ear normal.      Left Ear: Hearing and external ear normal.      Nose: Nose normal.   Eyes:      Conjunctiva/sclera: Conjunctivae normal.   Cardiovascular:      Rate and Rhythm: Normal rate.   Pulmonary:      Effort: Pulmonary effort is normal.      Breath sounds: No wheezing or rales.   Abdominal:      Palpations: Abdomen is soft.      Tenderness: There is no abdominal tenderness.   Musculoskeletal:      Cervical back: Normal range of motion.   Skin:     Findings: No rash.   Neurological:      Mental Status: Patient is alert and oriented to person, place, and time.   Psychiatric:         Mood and Affect: Mood and affect normal.         Judgment: Judgment normal.     Ortho Exam  Left hip: Surgical incision is well-healing, staples removed, no signs of surrounding erythema or active drainage.  Skin dry and intact.  Near full active forward hip flexion.  Full passive hip rotation in the seated position.  Good strength of the hip flexors, extensors, abductors and adductor's.  Full knee flexion and extension.  Full plantarflexion and dorsiflexion of the " ankle.  Able to wiggle toes.  Sensation and neurovascular intact distally.    Result Review :   The following data was reviewed by: BENJAMIN Ferrell on 05/18/2022:         Imaging Results (Most Recent)     Procedure Component Value Units Date/Time    XR Hip With or Without Pelvis 2 - 3 View Left [[id number]] Resulted: 05/18/22 1016     Updated: 05/18/22 1016    Narrative:      X-Ray Report:  Study: X-rays ordered, taken in the office, and reviewed today  Site: left hip Xray  Indication: left MARGIE   View: AP and Lateral view(s)  Findings: Left total hip arthroplasty is intact, no evidence of hardware   failure.   Prior studies available for comparison: yes                   Assessment and Plan    Problem List Items Addressed This Visit        Musculoskeletal and Injuries    Aftercare following left hip joint replacement surgery      Other Visit Diagnoses     Left hip pain    -  Primary    Relevant Orders    XR Hip With or Without Pelvis 2 - 3 View Left (Completed)          Follow Up   Return in about 4 weeks (around 6/15/2022).  Patient Instructions   Keep the incision clean and dry. Leave open to air. Continue use of ice and elevation for associated swelling. Continue physical therapy, progress weightbearing status with PT. Call with any changes or concerns.        Follow up in 4 weeks, no imaging.      Patient was given instructions and counseling regarding her condition or for health maintenance advice. Please see specific information pulled into the AVS if appropriate.

## 2022-05-25 ENCOUNTER — TREATMENT (OUTPATIENT)
Dept: PHYSICAL THERAPY | Facility: CLINIC | Age: 77
End: 2022-05-25

## 2022-05-25 DIAGNOSIS — R26.2 DIFFICULTY WALKING: ICD-10-CM

## 2022-05-25 DIAGNOSIS — Z96.642 AFTERCARE FOLLOWING LEFT HIP JOINT REPLACEMENT SURGERY: Primary | ICD-10-CM

## 2022-05-25 DIAGNOSIS — M25.552 LEFT HIP PAIN: ICD-10-CM

## 2022-05-25 DIAGNOSIS — Z47.1 AFTERCARE FOLLOWING LEFT HIP JOINT REPLACEMENT SURGERY: Primary | ICD-10-CM

## 2022-05-25 PROCEDURE — 97110 THERAPEUTIC EXERCISES: CPT | Performed by: PHYSICAL THERAPIST

## 2022-05-25 NOTE — PROGRESS NOTES
Physical Therapy Daily Treatment Note      Patient: Maegan Madera   : 1945  Referring practitioner: Zane Curry MD  Date of Initial Visit: Type: THERAPY  Noted: 2022  Today's Date: 2022  Patient seen for 3 sessions         Maegan Madera reports: pain is 2/10 today. She is doing well.      Subjective     Objective   See Exercise, Manual, and Modality Logs for complete treatment.       Assessment & Plan     Assessment    Assessment details: Pt is progressing well through exercises and is showing improvement in hip functional mobility and strength. Progress next visit as tolerated.         Visit Diagnoses:    ICD-10-CM ICD-9-CM   1. Aftercare following left hip joint replacement surgery  Z47.1 V54.81    Z96.642 V43.64   2. Difficulty walking  R26.2 719.7   3. Left hip pain  M25.552 719.45       Progress per Plan of Care           Timed:         Manual Therapy:    0     mins  01131;     Therapeutic Exercise:    25     mins  37131;     Neuromuscular Farhan:    0    mins  68598;    Therapeutic Activity:     0     mins  25933;     Gait Trainin     mins  52027;     Ultrasound:     0     mins  63383;    Ionto                               0    mins   88064  Self-care  __0__ mins 60270    Un-Timed:  Electrical Stimulation:    0     mins  64474 ( );  Traction     0     mins 31903  Hot pack     0     Mins    Cold pack                       0     Mins      Timed Treatment:  25   mins   Total Treatment:     25   mins    Sheryl Garcia PT, DPT  Physical Therapist    NPI:[id number]  [place] License: [id number]

## 2022-06-01 ENCOUNTER — TREATMENT (OUTPATIENT)
Dept: PHYSICAL THERAPY | Facility: CLINIC | Age: 77
End: 2022-06-01

## 2022-06-01 DIAGNOSIS — Z96.642 AFTERCARE FOLLOWING LEFT HIP JOINT REPLACEMENT SURGERY: Primary | ICD-10-CM

## 2022-06-01 DIAGNOSIS — M25.552 LEFT HIP PAIN: ICD-10-CM

## 2022-06-01 DIAGNOSIS — R26.2 DIFFICULTY WALKING: ICD-10-CM

## 2022-06-01 DIAGNOSIS — Z47.1 AFTERCARE FOLLOWING LEFT HIP JOINT REPLACEMENT SURGERY: Primary | ICD-10-CM

## 2022-06-01 PROCEDURE — 97140 MANUAL THERAPY 1/> REGIONS: CPT | Performed by: PHYSICAL THERAPIST

## 2022-06-01 PROCEDURE — 97110 THERAPEUTIC EXERCISES: CPT | Performed by: PHYSICAL THERAPIST

## 2022-06-01 NOTE — PROGRESS NOTES
Physical Therapy Daily Progress Note        Patient: Maegan Madera   : 1945  Diagnosis/ICD-10 Code:  Aftercare following left hip joint replacement surgery [Z47.1, Z96.642]  Referring practitioner: Zane Curry MD  Date of Initial Visit: Type: THERAPY  Noted: 2022  Today's Date: 2022  Patient seen for 4 sessions             Subjective   Maegan Madera reports that she had follow-up with Orthopedic last week.  She states she was pleased with progress of hip and has released her to drive.  She denies having any left hip pain, but does report that she continues to have central lower back pain.    Objective     Left Hip PROM:  Flexion:  105 degrees  Abduction:  25 degrees  IR:  15 degrees  ER:  50 degrees      See Exercise and Manual Logs for complete treatment.       Assessment/Plan  Pt tolerated therapy session well - with progression of therapeutic exercises, CKC-Functional activities, and Manual therapy. She has improved, but continues to have deficits in Left Hip ROM,  Strength, and Stability; limiting function and ability to perform ADLs at this time.    Progress per Plan of Care           Timed:  Manual Therapy:    8     mins  35616;  Therapeutic Exercise:    30     mins  83670;     Neuromuscular Farhan:    0    mins  59560;    Therapeutic Activity:     0     mins  55641;     Gait Trainin     mins  69088;     Ultrasound:     0     mins  77598;    Electrical Stimulation:    0     mins  99032;  Iontophoresis     0     mins  52283    Untimed:  Electrical Stimulation:    0     mins  58814 ( );  Mechanical Traction:    0     mins  83378;   Fluidotherapy     0     mins  79049  Hot pack     0     mins  21263  Cold pack     0     mins  68737    Timed Treatment:   38   mins   Total Treatment:     38   mins        Babs Davis PTA  Physical Therapist Assistant

## 2022-06-09 ENCOUNTER — TREATMENT (OUTPATIENT)
Dept: PHYSICAL THERAPY | Facility: CLINIC | Age: 77
End: 2022-06-09

## 2022-06-09 DIAGNOSIS — Z96.642 AFTERCARE FOLLOWING LEFT HIP JOINT REPLACEMENT SURGERY: Primary | ICD-10-CM

## 2022-06-09 DIAGNOSIS — Z47.1 AFTERCARE FOLLOWING LEFT HIP JOINT REPLACEMENT SURGERY: Primary | ICD-10-CM

## 2022-06-09 PROCEDURE — 97110 THERAPEUTIC EXERCISES: CPT | Performed by: PHYSICAL THERAPIST

## 2022-06-09 NOTE — PROGRESS NOTES
Physical Therapy Progress Note-Discharge      Patient: Maegan Madera   : 1945  Referring practitioner: Zane Curry MD  Date of Initial Visit: Type: THERAPY  Noted: 2022  Today's Date: 2022  Patient seen for 5 sessions           Subjective   Maegan Madera reports: no left hip pain, but some low back pain 3/10.  Patient states ready  Subjective Questionnaire: LEFS: 72/80=10% limitation improved from initial score 32/80=60% limitation      Objective          Neurological Testing     Additional Neurological Details  No c/o numbness or tingling    Active Range of Motion   Left Hip   Normal active range of motion    Strength/Myotome Testing     Left Hip   Planes of Motion   Flexion: 4  Extension: 4  Abduction: 4  Adduction: 4+    Ambulation     Comments   Patient not using an assistive device for ambulation.      See Exercise, Manual, and Modality Logs for complete treatment.       Assessment & Plan       Goals  Plan Goals: 1. The patient complains of left hip pain.   LTG 1: 8weeks:  The patient will report a pain rating of 2/10 or better in order to improve tolerance to activities of daily living and improve sleep quality.   STATUS:  Met  STG 1a: 4  weeks:  The patient will report a pain rating of 4/10 or better.   STATUS: Met      2. The patient demonstrates weakness of the left hip.   LTG 2: 8 weeks:  The patient will demonstrate 4+/5 strength for left hip flexion, abduction, and extension in order to improve hip stability.   STATUS:  Not Met   STG 2a: 4 weeks:  The patient will demonstrate 4/5 strength for left hip flexion, abduction, and extension.   STATUS:  Met      3. Mobility: Walking/Moving Around Functional Limitation                  LTG 3:8 weeks:  The patient will demonstrate 25% limitation by achieving a score of 60/80 on the Lower Extremity Functional Scale.   STATUS:  Met  STG 3a: 4 weeks:  The patient will demonstrate 37% limitation by achieving a score of 50/80 on the Lower Extremity  Functional Scale.     STATUS: Met     Plan  Treatment plan discussed with: patient  Plan details: Patient discharged to home exercise program.        Visit Diagnoses:    ICD-10-CM ICD-9-CM   1. Aftercare following left hip joint replacement surgery  Z47.1 V54.81    Z96.642 V43.64       Other: Discharged to home exercise program           Timed:  Manual Therapy:         mins  01899;  Therapeutic Exercise:    26     mins  24929;     Neuromuscular Farhan:        mins  77692;    Therapeutic Activity:          mins  67696;     Gait Training:           mins  30099;     Ultrasound:          mins  03206;    Electrical Stimulation:         mins  94429 ( );    Untimed:  Electrical Stimulation:         mins  70173 ( );  Mechanical Traction:         mins  80107;     Timed Treatment:   26   mins   Total Treatment:     26   mins  Leta Arredondo PT    Electronically singed 6/9/2022      [place] PT license: [id number]  Physical Therapist

## 2022-06-13 ENCOUNTER — OFFICE VISIT (OUTPATIENT)
Dept: ORTHOPEDIC SURGERY | Facility: CLINIC | Age: 77
End: 2022-06-13

## 2022-06-13 VITALS — HEART RATE: 106 BPM | HEIGHT: 67 IN | BODY MASS INDEX: 24.48 KG/M2 | WEIGHT: 156 LBS | OXYGEN SATURATION: 96 %

## 2022-06-13 DIAGNOSIS — Z47.1 AFTERCARE FOLLOWING LEFT HIP JOINT REPLACEMENT SURGERY: Primary | ICD-10-CM

## 2022-06-13 DIAGNOSIS — Z96.642 AFTERCARE FOLLOWING LEFT HIP JOINT REPLACEMENT SURGERY: Primary | ICD-10-CM

## 2022-06-13 PROCEDURE — 99024 POSTOP FOLLOW-UP VISIT: CPT | Performed by: PHYSICIAN ASSISTANT

## 2022-06-13 NOTE — PROGRESS NOTES
"Chief Complaint  Pain and Follow-up of the Left Hip    Subjective          Maegan Madera presents to [facility] for s/p left total hip arthroplasty anterior approach performed on 05/06/2022 by Dr. Curry.  Patient states she was discharged from physical therapy last week and is doing very well.  She is pleased with her progress and outcome thus far.  She does report low back pain since postoperatively.  She states her therapist provided her with home exercises upon her discharge to continue with.  She is here today fully weightbearing.  She has no other additional complaints.    Objective   No Known Allergies    Vital Signs:   Pulse 106   Ht 170.2 cm (67\")   Wt 70.8 kg (156 lb)   SpO2 96%   BMI 24.43 kg/m²       Physical Exam  Constitutional:       Appearance: Normal appearance. Patient is well-developed and normal weight.   HENT:      Head: Normocephalic.      Right Ear: Hearing and external ear normal.      Left Ear: Hearing and external ear normal.      Nose: Nose normal.   Eyes:      Conjunctiva/sclera: Conjunctivae normal.   Cardiovascular:      Rate and Rhythm: Normal rate.   Pulmonary:      Effort: Pulmonary effort is normal.      Breath sounds: No wheezing or rales.   Abdominal:      Palpations: Abdomen is soft.      Tenderness: There is no abdominal tenderness.   Musculoskeletal:      Cervical back: Normal range of motion.   Skin:     Findings: No rash.   Neurological:      Mental Status: Patient is alert and oriented to person, place, and time.   Psychiatric:         Mood and Affect: Mood and affect normal.         Judgment: Judgment normal.     Ortho Exam  Left hip: Well-healed surgical incision without surrounding erythema, no discoloration, no swelling, no tenderness to palpation.  Full active forward flexion.  Good passive IR/ER of the hip in the seated position without groin pain elicited.  Good muscle tone the hip flexors, extensors, abductors and adductor's.  Full " plantarflexion and dorsiflexion of the ankle.  Fully weightbearing, gait is nonantalgic.  Sensation and pulses are intact.  Neurovascular intact distally.    Result Review :   The following data was reviewed by: BENJAMIN Ferrell on 06/13/2022:         Imaging Results (Most Recent)     None                Assessment and Plan    Problem List Items Addressed This Visit        Musculoskeletal and Injuries    Aftercare following left hip joint replacement surgery - Primary          Follow Up   Return in about 6 weeks (around 7/25/2022).  Patient Instructions   Patient is doing great. She was discharged from PT last week. Recommend continuing with home exercises for the low back, as provided by PT.     Call with any changes or concerns.     Follow up in 6 weeks. Repeat x-ray.     Patient was given instructions and counseling regarding her condition or for health maintenance advice. Please see specific information pulled into the AVS if appropriate.

## 2022-06-13 NOTE — PATIENT INSTRUCTIONS
Patient is doing great. She was discharged from PT last week. Recommend continuing with home exercises for the low back, as provided by PT.     Call with any changes or concerns.     Follow up in 6 weeks. Repeat x-ray.

## 2022-07-25 ENCOUNTER — OFFICE VISIT (OUTPATIENT)
Dept: ORTHOPEDIC SURGERY | Facility: CLINIC | Age: 77
End: 2022-07-25

## 2022-07-25 VITALS — BODY MASS INDEX: 24.33 KG/M2 | HEART RATE: 71 BPM | WEIGHT: 155 LBS | HEIGHT: 67 IN | OXYGEN SATURATION: 99 %

## 2022-07-25 DIAGNOSIS — Z96.642 AFTERCARE FOLLOWING LEFT HIP JOINT REPLACEMENT SURGERY: Primary | ICD-10-CM

## 2022-07-25 DIAGNOSIS — Z47.1 AFTERCARE FOLLOWING LEFT HIP JOINT REPLACEMENT SURGERY: Primary | ICD-10-CM

## 2022-07-25 PROCEDURE — 99024 POSTOP FOLLOW-UP VISIT: CPT | Performed by: PHYSICIAN ASSISTANT

## 2022-07-25 RX ORDER — SODIUM BICARBONATE 650 MG/1
TABLET ORAL
COMMUNITY
Start: 2022-06-22

## 2022-07-25 NOTE — PROGRESS NOTES
"Chief Complaint  Pain and Follow-up of the Left Hip    Subjective          Maegan Madera presents to [facility] for s/p left total hip arthroplasty anterior approach performed on 05/06/2022 by Dr. Curry.  Patient states she is doing very well.  She states pain is improved from preoperatively.  She is pleased with progress and outcome.  She plans to discuss right total hip in the future.  She states her right hip is doing well today however.  She is here today fully weightbearing.    Objective   No Known Allergies    Vital Signs:   Pulse 71   Ht 170.2 cm (67\")   Wt 70.3 kg (155 lb)   SpO2 99%   BMI 24.28 kg/m²       Physical Exam    Constitutional: Awake, alert   Integumentary: Warm, dry, intact   HENT: Atraumatic, normocephalic    Respiratory: Non labored respirations    Cardiovascular: Intact peripheral pulses    Psychiatric: Normal mood and affect. A&O x 3    Ortho Exam  Left hip: Well-healed surgical incision, no swelling or discoloration, skin dry and intact.  Strength to the hip flexors, extensors, abductors and adductor's.  Ankle motion is intact.  Fully weightbearing, gait is nonantalgic with good heel strike.  Sensation intact.  Neurovascular intact distally.    Result Review :       Imaging Results (Most Recent)     None       Study Result    Narrative & Impression   X-Ray Report:  Study: X-rays ordered, taken in the office, and reviewed today  Site: left hip/pelvis Xray  Indication: Left MARGIE  View: AP and Lateral view(s)  Findings: Intact left MARGIE without evidence of hardware failure or loosening.   Prior studies available for comparison: yes               Assessment and Plan    Problem List Items Addressed This Visit        Musculoskeletal and Injuries    Aftercare following left hip joint replacement surgery - Primary    Relevant Orders    XR Hip With or Without Pelvis 2 - 3 View Left (Completed)          Follow Up   Return in about 9 months (around 4/25/2023).  Patient " Instructions   X-rays taken and reviewed. Continue HEP.     Ensure antibiotic prophylaxis is given prior to dental procedures.    Call with any changes or concerns.  Follow up in 9 months. X-rays needed.       Patient was given instructions and counseling regarding her condition or for health maintenance advice. Please see specific information pulled into the AVS if appropriate.

## 2022-07-25 NOTE — PATIENT INSTRUCTIONS
X-rays taken and reviewed. Continue HEP.     Ensure antibiotic prophylaxis is given prior to dental procedures.    Call with any changes or concerns.  Follow up in 9 months. X-rays needed.

## 2022-12-19 ENCOUNTER — TRANSCRIBE ORDERS (OUTPATIENT)
Dept: ADMINISTRATIVE | Facility: HOSPITAL | Age: 77
End: 2022-12-19

## 2022-12-19 ENCOUNTER — HOSPITAL ENCOUNTER (OUTPATIENT)
Dept: GENERAL RADIOLOGY | Facility: HOSPITAL | Age: 77
Discharge: HOME | End: 2022-12-19
Admitting: INTERNAL MEDICINE

## 2022-12-19 DIAGNOSIS — R05.9 COUGH, UNSPECIFIED TYPE: ICD-10-CM

## 2022-12-19 DIAGNOSIS — R05.9 COUGH, UNSPECIFIED TYPE: Primary | ICD-10-CM

## 2022-12-19 DIAGNOSIS — R06.00 DYSPNEA, UNSPECIFIED TYPE: ICD-10-CM

## 2022-12-19 PROCEDURE — 71046 X-RAY EXAM CHEST 2 VIEWS: CPT

## 2023-04-26 ENCOUNTER — OFFICE VISIT (OUTPATIENT)
Dept: ORTHOPEDIC SURGERY | Facility: CLINIC | Age: 78
End: 2023-04-26
Payer: MEDICARE

## 2023-04-26 VITALS — HEIGHT: 67 IN | WEIGHT: 154.98 LBS | OXYGEN SATURATION: 97 % | BODY MASS INDEX: 24.33 KG/M2 | HEART RATE: 101 BPM

## 2023-04-26 DIAGNOSIS — G89.29 CHRONIC BILATERAL LOW BACK PAIN WITHOUT SCIATICA: ICD-10-CM

## 2023-04-26 DIAGNOSIS — Z96.642 HISTORY OF TOTAL HIP ARTHROPLASTY, LEFT: ICD-10-CM

## 2023-04-26 DIAGNOSIS — M25.551 RIGHT HIP PAIN: ICD-10-CM

## 2023-04-26 DIAGNOSIS — M54.50 CHRONIC BILATERAL LOW BACK PAIN WITHOUT SCIATICA: ICD-10-CM

## 2023-04-26 DIAGNOSIS — M16.11 PRIMARY OSTEOARTHRITIS OF RIGHT HIP: ICD-10-CM

## 2023-04-26 DIAGNOSIS — M25.552 LEFT HIP PAIN: Primary | ICD-10-CM

## 2023-04-26 RX ORDER — DEXAMETHASONE SODIUM PHOSPHATE 4 MG/ML
8 INJECTION, SOLUTION INTRA-ARTICULAR; INTRALESIONAL; INTRAMUSCULAR; INTRAVENOUS; SOFT TISSUE ONCE
Status: COMPLETED | OUTPATIENT
Start: 2023-04-26 | End: 2023-04-26

## 2023-04-26 RX ORDER — SUCRALFATE ORAL 1 G/10ML
SUSPENSION ORAL
COMMUNITY
Start: 2023-03-28

## 2023-04-26 RX ADMIN — DEXAMETHASONE SODIUM PHOSPHATE 8 MG: 4 INJECTION, SOLUTION INTRA-ARTICULAR; INTRALESIONAL; INTRAMUSCULAR; INTRAVENOUS; SOFT TISSUE at 13:36

## 2023-04-26 ASSESSMENT — HOOS JR
HOOS JR SCORE: 2
HOOS JR SCORE: 85.257

## 2023-04-26 NOTE — PROGRESS NOTES
"Chief Complaint  Pain and Follow-up of the Left Hip and Pain and Follow-up of the Right Hip    Subjective      Maegan Madera presents to [facility] for follow-up of left hip and initial evaluation of right hip.  She has a history of left total hip arthroplasty with anterior approach performed on 5/6/2022 by Dr. Curry.  Patient presents today independently ambulatory without use of assistive device.  Reports that her left hip is doing \"wonderful\".  She reports no complaints related to her left hip.  She does report that her right hip is \"starting to hurt pretty bad\".  However, upon questioning the patient localizes this to right lower lumbar region, stating actually the left lower lumbar region is more painful than the right.  She denies any groin pain.  No red flag symptoms and no lower extremity radiculopathy.    Objective   No Known Allergies    Vital Signs:   Pulse 101   Ht 170.2 cm (67\")   Wt 70.3 kg (154 lb 15.7 oz)   SpO2 97%   BMI 24.27 kg/m²       Physical Exam    Constitutional: Awake, alert. Well nourished appearance.    Integumentary: Warm, dry, intact. No obvious rashes.    HENT: Atraumatic, normocephalic.   Respiratory: Non labored respirations .   Cardiovascular: Intact peripheral pulses.    Psychiatric: Normal mood and affect. A&O X3    Ortho Exam  Left hip: Well-healed surgical scar noted.  Skin is warm, dry, and intact.  Good strength to hip flexors, extensors, abductors, and adductors.  Full flexion and extension of the knee.  Full plantarflexion and dorsiflexion of the ankle.  Sensation intact light touch.  Distal neurovascular intact.      Right hip: No pain with passive internal or external rotation of the hip.  No tenderness to palpation of greater trochanter.  Good strength to hip flexors, extensors, abductors, and adductors -with full ROM reported without pain.  Full flexion and extension of the knee.  Full plantarflexion and dorsiflexion of the ankle.  " Sensation intact light touch.    Musculoskeletal: Tenderness to palpation to lower lumbar paraspinal muscles..  No no midline tenderness to palpation.  No step-offs or deformities.  No acute focal neurodeficits noted.    Imaging Results (Most Recent)     Procedure Component Value Units Date/Time    XR Hip With or Without Pelvis 2 - 3 View Right [[id number]] Resulted: 04/26/23 1357     Updated: 04/26/23 1358    Narrative:      X-Ray Report:  Study: X-rays ordered, taken in the office, and reviewed today.   Site: Right hip Xray  Indication: Pain  View: AP/Lateral view(s)  Findings: Advanced arthritic changes of the right hip joint.  No acute   osseous abnormalities.  Prior studies available for comparison: yes       XR Hip With or Without Pelvis 2 - 3 View Left [[id number]] Resulted: 04/26/23 1352     Updated: 04/26/23 1353    Narrative:      X-Ray Report:  Study: X-rays ordered, taken in the office, and reviewed today.   Site: Left hip Xray  Indication: MARGEI  View: AP/Lateral view(s)  Findings: Intact left total hip arthroplasty. No evidence of hardware   malfunction.   Prior studies available for comparison: yes                     Assessment and Plan   Problem List Items Addressed This Visit    None  Visit Diagnoses     Left hip pain    -  Primary    Relevant Orders    XR Hip With or Without Pelvis 2 - 3 View Left (Completed)    Right hip pain        Relevant Medications    dexamethasone (DECADRON) injection 8 mg (Completed) (Start on 4/26/2023  2:30 PM)    Other Relevant Orders    XR Hip With or Without Pelvis 2 - 3 View Right (Completed)    History of total hip arthroplasty, left        Chronic bilateral low back pain without sciatica        Relevant Orders    Ambulatory Referral to Physical Therapy Evaluate and treat; Stretching, ROM, Strengthening (Completed)    Primary osteoarthritis of right hip        Relevant Orders    Ambulatory Referral to Physical Therapy Evaluate and treat; Stretching, ROM, Strengthening  (Completed)        Follow Up   Return in about 1 year (around 4/26/2024).  Patient is a former smoker.  Encouraged continued tobacco cessation.  Did not discuss options for smoking cessation.    Patient Instructions   In regards to left hip, patient is doing very well. X-rays reviewed, showing hardware intact. Continue home exercise program to progress strength and ROM. Continue with lifelong antibiotic prophylaxis with dental procedures following total joint replacement. Follow-up in 12 months. Call sooner, if needed with any changes or concerns. Repeat x-rays.     Patient initially reporting readiness to discuss R MARGIE, however, upon further PE and discussion of symptoms, patient localizing low back pain L worse than R. She has no c/o groin pain and no pain with passive internal or external rotation of the hip. Discussed trial of IM steroid vs diagnostic and therapeutic intra-articular steroid injection. Patient would like to proceed with IM injection and referral to PT. Follow up in 6 weeks.       Patient was given instructions and counseling regarding her condition or for health maintenance advice. Please see specific information pulled into the AVS if appropriate.

## 2023-04-26 NOTE — PATIENT INSTRUCTIONS
In regards to left hip, patient is doing very well. X-rays reviewed, showing hardware intact. Continue home exercise program to progress strength and ROM. Continue with lifelong antibiotic prophylaxis with dental procedures following total joint replacement. Follow-up in 12 months. Call sooner, if needed with any changes or concerns. Repeat x-rays.     Patient initially reporting readiness to discuss R MARGIE, however, upon further PE and discussion of symptoms, patient localizing low back pain L worse than R. She has no c/o groin pain and no pain with passive internal or external rotation of the hip. Discussed trial of IM steroid vs diagnostic and therapeutic intra-articular steroid injection. Patient would like to proceed with IM injection and referral to PT. Follow up in 6 weeks.

## 2023-06-07 ENCOUNTER — OFFICE VISIT (OUTPATIENT)
Dept: ORTHOPEDIC SURGERY | Facility: CLINIC | Age: 78
End: 2023-06-07
Payer: MEDICARE

## 2023-06-07 VITALS — OXYGEN SATURATION: 98 % | HEIGHT: 67 IN | BODY MASS INDEX: 24.33 KG/M2 | WEIGHT: 154.98 LBS | HEART RATE: 102 BPM

## 2023-06-07 DIAGNOSIS — M54.50 CHRONIC BILATERAL LOW BACK PAIN WITHOUT SCIATICA: Primary | ICD-10-CM

## 2023-06-07 DIAGNOSIS — G89.29 CHRONIC BILATERAL LOW BACK PAIN WITHOUT SCIATICA: Primary | ICD-10-CM

## 2023-06-07 RX ORDER — SUCRALFATE 1 G/1
TABLET ORAL
COMMUNITY
Start: 2023-05-25

## 2023-06-07 NOTE — PATIENT INSTRUCTIONS
Patient noted no improvement with IM injection, however, has had improvement with PT. Advised to continue PT to completion. Updated PT order placed. Continue home exercises.     Follow up in 6 weeks. If not better, consider MRI. Call with changes or concerns.

## 2023-06-07 NOTE — PROGRESS NOTES
"Chief Complaint  Pain and Follow-up of the Right Hip    Subjective      Maegan Madera presents to [facility] for follow-up of right low back pain.  She was last seen in office on 4/26/2023 with complaint of right hip pain, which it is actually localized to the right lower lumbar region.  She received referral to physical therapy and trial of IM steroid injection.  She presents today for follow-up stating that she had no improvement following steroid injection.  She has been participating in outpatient physical therapy through [facility] and reports that \"pain is still there, but it is less intense\".  She does feel PT is beneficial and would like to continue at this time.  She is taking Celebrex, Tylenol, and using topical anti-inflammatory cream with relief.  Denies any groin pain or radiculopathy.    Objective   No Known Allergies    Vital Signs:   Pulse 102   Ht 170.2 cm (67\")   Wt 70.3 kg (154 lb 15.7 oz)   SpO2 98%   BMI 24.27 kg/m²       Physical Exam    Constitutional: Awake, alert. Well nourished appearance.    Integumentary: Warm, dry, intact. No obvious rashes.    HENT: Atraumatic, normocephalic.   Respiratory: Non labored respirations .   Cardiovascular: Intact peripheral pulses.    Psychiatric: Normal mood and affect. A&O X3    Ortho Exam  Musculoskeletal: Tenderness to palpation of right lower lumbar region paraspinal muscles.  No step-offs or deformities.    Right hip: Good strength to hip flexors, extensors, abductors, and adductors.  No pain with passive internal or external rotation of the hip.  Full flexion and extension of the knee.  Full plantarflexion and dorsiflexion of the ankle.  Sensation intact light touch.  Distal neurovascular intact.    Imaging Results (Most Recent)       None                      Assessment and Plan   Problem List Items Addressed This Visit    None  Visit Diagnoses       Chronic bilateral low back pain without sciatica    -  Primary    Relevant " Orders    Ambulatory Referral to Physical Therapy Evaluate and treat; Stretching, ROM, Strengthening (Completed)            Follow Up   Return in about 6 weeks (around 7/19/2023).    Tobacco Use: Medium Risk    Smoking Tobacco Use: Former    Smokeless Tobacco Use: Never    Passive Exposure: Not on file       Patient is a former smoker.  Encouraged continued tobacco cessation.  Did not discuss options for smoking cessation.    Patient Instructions   Patient noted no improvement with IM injection, however, has had improvement with PT. Advised to continue PT to completion. Updated PT order placed. Continue home exercises.     Follow up in 6 weeks. If not better, consider MRI. Call with changes or concerns.  Patient was given instructions and counseling regarding her condition or for health maintenance advice. Please see specific information pulled into the AVS if appropriate.

## 2023-08-22 ENCOUNTER — HOSPITAL ENCOUNTER (OUTPATIENT)
Dept: MRI IMAGING | Facility: HOSPITAL | Age: 78
Discharge: HOME | End: 2023-08-22
Admitting: PHYSICIAN ASSISTANT
Payer: MEDICARE

## 2023-08-22 DIAGNOSIS — M54.50 CHRONIC BILATERAL LOW BACK PAIN WITHOUT SCIATICA: ICD-10-CM

## 2023-08-22 DIAGNOSIS — G89.29 CHRONIC BILATERAL LOW BACK PAIN WITHOUT SCIATICA: ICD-10-CM

## 2023-08-22 PROCEDURE — 72148 MRI LUMBAR SPINE W/O DYE: CPT

## 2023-08-30 ENCOUNTER — OFFICE VISIT (OUTPATIENT)
Dept: ORTHOPEDIC SURGERY | Facility: CLINIC | Age: 78
End: 2023-08-30
Payer: MEDICARE

## 2023-08-30 VITALS — HEIGHT: 67 IN | BODY MASS INDEX: 23.54 KG/M2 | WEIGHT: 150 LBS

## 2023-08-30 DIAGNOSIS — M48.061 SPINAL STENOSIS OF LUMBAR REGION WITHOUT NEUROGENIC CLAUDICATION: Primary | ICD-10-CM

## 2023-08-30 DIAGNOSIS — M54.50 CHRONIC LEFT-SIDED LOW BACK PAIN WITHOUT SCIATICA: ICD-10-CM

## 2023-08-30 DIAGNOSIS — G89.29 CHRONIC LEFT-SIDED LOW BACK PAIN WITHOUT SCIATICA: ICD-10-CM

## 2023-08-30 PROCEDURE — 1160F RVW MEDS BY RX/DR IN RCRD: CPT | Performed by: PHYSICIAN ASSISTANT

## 2023-08-30 PROCEDURE — 1159F MED LIST DOCD IN RCRD: CPT | Performed by: PHYSICIAN ASSISTANT

## 2023-08-30 PROCEDURE — 99214 OFFICE O/P EST MOD 30 MIN: CPT | Performed by: PHYSICIAN ASSISTANT

## 2023-08-30 NOTE — PROGRESS NOTES
"Chief Complaint  Pain and Follow-up of the left hip and low back pain    Subjective      Maegan Madera presents to [facility] for follow-up of left hip/low back pain.  She was last seen in office on 7/19/2023, localizing pain to the left lower lumbar region.  She had been participating in outpatient physical therapy although reported PT stating that she may benefit from further diagnostic testing and/or medical interventions.  Decision was made to pursue MRI of the lumbar spine and patient presents today with these results showing multilevel lumbar degenerative disease with areas of central canal stenosis.  She presents today independently ambulatory without use of assistive device.  Reports that pain is \"still there\".  She reports pain is worsened with standing or walking.  Denies any saddle anesthesias, changes in bowel or bladder function, or unilateral extremity numbness or weakness.  Patient denies groin pain.    Objective   No Known Allergies    Vital Signs:   Ht 170.2 cm (67\")   Wt 68 kg (150 lb)   BMI 23.49 kg/mý       Physical Exam    Constitutional: Awake, alert. Well nourished appearance.    Integumentary: Warm, dry, intact. No obvious rashes.    HENT: Atraumatic, normocephalic.   Respiratory: Non labored respirations .   Cardiovascular: Intact peripheral pulses.    Psychiatric: Normal mood and affect. A&O X3    Ortho Exam  Musculoskeletal: No step-offs or deformities.  Left lumbar paraspinal tenderness to palpation.  No acute focal neurologic deficits.    Imaging:  MRI lumbar spine without contrast  Findings: T12-L1: Mild diffuse disc bulge.  No significant central canal or foraminal stenosis.  Incidental left nerve root sleeve cyst.  L1-L2: Diffuse disc bulge asymmetric to the left causing mild flattening of the ventral thecal sac.  Mild left foraminal stenosis.  L2-L3: Diffuse disc bulge resulting in slight L2 retrolisthesis.  Mild bilateral facet arthropathy with " ligamentum flavum thickening.  Mild central canal stenosis.  Moderate right and mild left foraminal stenosis.  L3-L4 diffuse disc bulge.  Bilateral facet arthropathy with ligamentum flavum thickening.  Mild central canal and bilateral foraminal stenosis.  L4-L5: Diffuse disc bulge.  Bilateral facet arthropathy with ligamentum flavum thickening.  Mild central canal stenosis.  Mild bilateral foraminal stenosis.  L5-S1: Mild diffuse disc bulge.  Grade 1 L5 anterolisthesis likely on the basis of facet degeneration.  Mild central canal stenosis.  Moderate bilateral foraminal stenosis.  Conus terminates at T12-L1.  No abnormality of the distal cord and cauda equina.  No paraspinal mass or fluid collection.  2.7 cm simple appearing right ovarian cyst.              Assessment and Plan   Problem List Items Addressed This Visit    None  Visit Diagnoses       Spinal stenosis of lumbar region without neurogenic claudication    -  Primary    Relevant Orders    Ambulatory Referral to Neurosurgery    Chronic left-sided low back pain without sciatica        Relevant Orders    Ambulatory Referral to Neurosurgery            Follow Up   Return if symptoms worsen or fail to improve.    Tobacco Use: Medium Risk    Smoking Tobacco Use: Former    Smokeless Tobacco Use: Never    Passive Exposure: Not on file     Patient is a former smoker.  Encouraged continued tobacco cessation.  Did not discuss options for smoking cessation.    Patient Instructions   MRI results discussed and reviewed with patient. Referral placed for Neurosurgery evaluation. Neurosurgery assistance appreciated.  Patient did not feel she had benefit from physical therapy and would like to hold off on additional PT at this time.  Advised to continue home exercise program.    Of note, MRI did show incidental finding of simple appearing R ovarian cyst measuring about 2.7cm. Discussed with patient and advised follow up with PCP.     Follow up as needed. Call with changes or  concerns.  Patient verbalized understanding and agreement.  Patient was given instructions and counseling regarding her condition or for health maintenance advice. Please see specific information pulled into the AVS if appropriate.

## 2023-08-30 NOTE — PATIENT INSTRUCTIONS
MRI results discussed and reviewed with patient. Referral placed for Neurosurgery evaluation. Neurosurgery assistance appreciated.  Patient did not feel she had benefit from physical therapy and would like to hold off on additional PT at this time.  Advised to continue home exercise program.    Of note, MRI did show incidental finding of simple appearing R ovarian cyst measuring about 2.7cm. Discussed with patient and advised follow up with PCP.     Follow up as needed. Call with changes or concerns.  Patient verbalized understanding and agreement.

## 2023-09-19 ENCOUNTER — OFFICE VISIT (OUTPATIENT)
Dept: NEUROSURGERY | Facility: CLINIC | Age: 78
End: 2023-09-19
Payer: MEDICARE

## 2023-09-19 VITALS
HEIGHT: 67 IN | HEART RATE: 93 BPM | SYSTOLIC BLOOD PRESSURE: 144 MMHG | WEIGHT: 154.8 LBS | BODY MASS INDEX: 24.3 KG/M2 | DIASTOLIC BLOOD PRESSURE: 63 MMHG

## 2023-09-19 DIAGNOSIS — M46.1 INFLAMMATION OF BOTH SACROILIAC JOINTS: Primary | ICD-10-CM

## 2023-09-19 DIAGNOSIS — M47.817 SPONDYLOSIS OF LUMBOSACRAL REGION WITHOUT MYELOPATHY OR RADICULOPATHY: ICD-10-CM

## 2023-09-19 RX ORDER — MULTIPLE VITAMINS W/ MINERALS TAB 9MG-400MCG
1 TAB ORAL DAILY
COMMUNITY

## 2023-09-19 RX ORDER — PHENOL 1.4 %
600 AEROSOL, SPRAY (ML) MUCOUS MEMBRANE DAILY
COMMUNITY

## 2023-09-19 RX ORDER — CHLORAL HYDRATE 500 MG
CAPSULE ORAL
COMMUNITY

## 2023-09-19 NOTE — PROGRESS NOTES
"Chief Complaint  Back Pain (Low back pain when standing long periods of time or walking)    Subjective          Maegan Madera who is a 77 y.o. year old female who presents to [facility] for evaluation of lumbar spine.      The patient complains of pain located in the lumbar spine.  Patients states the pain has been present for 3 years.  The pain came on gradually.  Pain started on the left side, though has progressed  to the right side over the past year. The pain scale level is 9 with activities.  The pain does not radiate. .  The pain is waxing/waning and described as aching and throbbing.  The pain is worse at nighttime. Patient states prolonged standing, prolonged walking, bending, twisting, and lifting makes the pain worse.  Patient states rest makes the pain better.    Associated Symptoms Include: Denies numbness and tingling  Conservative Interventions Include: NSAIDs that were not very effective. Tylenol arthritis provides some relief. She was prescribed a muscle relaxant, Flexeril 5 mg, which provided some relief. She was doing Physical therapy for two months at [facility] in [place]. This helped initially though was no longer beneficial.     Was this the result of an injury or accident? : No    History of Previous Spinal Surgery?: No    Nicotine use: former smoker, quit 2005    BMI: Body mass index is 24.25 kg/m².      Review of Systems   Musculoskeletal:  Positive for arthralgias, back pain and myalgias.   All other systems reviewed and are negative.     Objective   Vital Signs:   /63 (BP Location: Left arm)   Pulse 93   Ht 170.2 cm (67\")   Wt 70.2 kg (154 lb 12.8 oz)   BMI 24.25 kg/m²       Physical Exam  Vitals reviewed.   Constitutional:       Appearance: Normal appearance.   Musculoskeletal:      Lumbar back: No tenderness. Negative right straight leg raise test and negative left straight leg raise test.      Right hip: Tenderness (SI joint) present. " Normal range of motion.      Left hip: Tenderness (SI joint) present. Normal range of motion.   Neurological:      Mental Status: She is alert and oriented to person, place, and time.      Motor: Motor strength is normal.      Gait: Gait is intact.      Deep Tendon Reflexes:      Reflex Scores:       Patellar reflexes are 2+ on the right side and 2+ on the left side.       Achilles reflexes are 2+ on the right side and 2+ on the left side.     Neurologic Exam     Mental Status   Oriented to person, place, and time.   Level of consciousness: alert    Motor Exam   Muscle bulk: normal  Overall muscle tone: normal    Strength   Strength 5/5 throughout.     Sensory Exam   Light touch normal.     Gait, Coordination, and Reflexes     Gait  Gait: normal    Reflexes   Right patellar: 2+  Left patellar: 2+  Right achilles: 2+  Left achilles: 2+  Right ankle clonus: absent  Left ankle clonus: absent     Result Review :       Data reviewed : Radiologic studies MRI Lumbar Spine on 8/22/23 at [facility] personally reviewed. Mild multilevel degenerative changes, resulting in mild spinal canal and foraminal narrowing at several levels.            Assessment and Plan    Diagnoses and all orders for this visit:    1. Inflammation of both sacroiliac joints (Primary)  -     Ambulatory Referral to Pain Management Clinic    2. Spondylosis of lumbosacral region without myelopathy or radiculopathy  -     Ambulatory Referral to Pain Management Clinic    Pt presenting for evaluation of back pain. We reviewed her MRI Lumbar Spine, demonstrating mild degenerative changes. Her exam demonstrates tenderness at the SI joints. Will refer to pain management for SI joint injections. Could consider lumbar RFA trial in the future.     We discussed the importance of core strengthening, avoidance of activities that worsen the pain, nicotine cessation and maintenance of healthy weight. Surgery for patients with multilevel degenerative disc disease is not  typically successful with the risks outweighing the benefit.       Follow up as needed.       Follow Up   Return if symptoms worsen or fail to improve.  Patient was given instructions and counseling regarding her condition or for health maintenance advice.     -Referral to pain management for SI joint injections  -Follow up as needed

## 2024-05-01 ENCOUNTER — OFFICE VISIT (OUTPATIENT)
Dept: ORTHOPEDIC SURGERY | Facility: CLINIC | Age: 79
End: 2024-05-01
Payer: MEDICARE

## 2024-05-01 VITALS
SYSTOLIC BLOOD PRESSURE: 136 MMHG | HEART RATE: 86 BPM | HEIGHT: 67 IN | DIASTOLIC BLOOD PRESSURE: 79 MMHG | OXYGEN SATURATION: 96 % | BODY MASS INDEX: 24.17 KG/M2 | WEIGHT: 154 LBS

## 2024-05-01 DIAGNOSIS — Z96.642 HISTORY OF TOTAL HIP ARTHROPLASTY, LEFT: Primary | ICD-10-CM

## 2024-05-01 PROCEDURE — 99213 OFFICE O/P EST LOW 20 MIN: CPT | Performed by: PHYSICIAN ASSISTANT

## 2024-05-01 PROCEDURE — 1160F RVW MEDS BY RX/DR IN RCRD: CPT | Performed by: PHYSICIAN ASSISTANT

## 2024-05-01 PROCEDURE — 1159F MED LIST DOCD IN RCRD: CPT | Performed by: PHYSICIAN ASSISTANT

## 2024-09-16 ENCOUNTER — TRANSCRIBE ORDERS (OUTPATIENT)
Dept: ADMINISTRATIVE | Facility: HOSPITAL | Age: 79
End: 2024-09-16
Payer: MEDICARE

## 2024-09-16 DIAGNOSIS — M47.817 LUMBOSACRAL SPONDYLOSIS WITHOUT MYELOPATHY: Primary | ICD-10-CM

## 2024-10-25 ENCOUNTER — HOSPITAL ENCOUNTER (OUTPATIENT)
Dept: MRI IMAGING | Facility: HOSPITAL | Age: 79
Discharge: HOME | End: 2024-10-25
Payer: MEDICARE

## 2024-10-25 DIAGNOSIS — M47.817 LUMBOSACRAL SPONDYLOSIS WITHOUT MYELOPATHY: ICD-10-CM

## 2024-10-25 PROCEDURE — 72148 MRI LUMBAR SPINE W/O DYE: CPT

## 2024-10-29 ENCOUNTER — OFFICE VISIT (OUTPATIENT)
Dept: NEUROSURGERY | Facility: CLINIC | Age: 79
End: 2024-10-29
Payer: MEDICARE

## 2024-10-29 VITALS
BODY MASS INDEX: 24.32 KG/M2 | HEIGHT: 68 IN | WEIGHT: 160.5 LBS | SYSTOLIC BLOOD PRESSURE: 115 MMHG | HEART RATE: 106 BPM | DIASTOLIC BLOOD PRESSURE: 73 MMHG

## 2024-10-29 DIAGNOSIS — M47.817 SPONDYLOSIS OF LUMBOSACRAL REGION WITHOUT MYELOPATHY OR RADICULOPATHY: Primary | ICD-10-CM

## 2024-10-29 PROCEDURE — 1159F MED LIST DOCD IN RCRD: CPT | Performed by: NURSE PRACTITIONER

## 2024-10-29 PROCEDURE — 1160F RVW MEDS BY RX/DR IN RCRD: CPT | Performed by: NURSE PRACTITIONER

## 2024-10-29 PROCEDURE — 99214 OFFICE O/P EST MOD 30 MIN: CPT | Performed by: NURSE PRACTITIONER

## 2024-10-29 NOTE — PROGRESS NOTES
Chief Complaint  Inflammation of both sacroiliac joints    Subjective          Maegan Madera who is a 79 y.o. year old female who presents to [facility]UR[facility]ERY for follow up. MRI Lumbar Spine.     History of Present Illness  The patient is a 79-year-old female presenting for low back pain with a recent MRI of the lumbar spine.    She reports persistent pain primarily in the back, radiating around her hips and into her stomach. Despite attempts at pain management, including SI joint injections and facet injections, she experiences only temporary relief lasting less than a day. The pain intensifies during activities such as standing, vacuuming or any movement involving back and forth motion. Initially, the pain was predominantly on the left side of her back, but it has since spread to the right side as well. She experiences pain when standing and moving, but not when sitting or lying down.    She is currently taking Celebrex and Tylenol for arthritis but has not taken Flexeril for some time. A muscle relaxer prescribed by Dr. Villarreal provided some relief.         Interval History 9/19/23    Maegan Madera who is a 77 y.o. year old female who presents to [facility]UR[facility]ERY for evaluation of lumbar spine.        The patient complains of pain located in the lumbar spine.  Patients states the pain has been present for 3 years.  The pain came on gradually.  Pain started on the left side, though has progressed  to the right side over the past year. The pain scale level is 9 with activities.  The pain does not radiate. .  The pain is waxing/waning and described as aching and throbbing.  The pain is worse at nighttime. Patient states prolonged standing, prolonged walking, bending, twisting, and lifting makes the pain worse.  Patient states rest makes the pain better.     Associated Symptoms Include: Denies numbness and tingling  Conservative Interventions  "Include: NSAIDs that were not very effective. Tylenol arthritis provides some relief. She was prescribed a muscle relaxant, Flexeril 5 mg, which provided some relief. She was doing Physical therapy for two months at [facility] in [place]. This helped initially though was no longer beneficial.      Was this the result of an injury or accident? : No     History of Previous Spinal Surgery?: No     Nicotine use: former smoker, quit 2005     Recent Interventions: SI joint injections. Facet injections.      Review of Systems   Musculoskeletal:  Positive for arthralgias, back pain and myalgias.   All other systems reviewed and are negative.       Objective   Vital Signs:   /73 (BP Location: Left arm, Patient Position: Sitting)   Pulse 106   Ht 172.7 cm (68\")   Wt 72.8 kg (160 lb 8 oz)   BMI 24.40 kg/m²       Physical Exam  Vitals reviewed.   Constitutional:       Appearance: Normal appearance.   Musculoskeletal:      Lumbar back: No tenderness. Negative right straight leg raise test and negative left straight leg raise test.      Right hip: No tenderness. Normal range of motion.      Left hip: No tenderness. Normal range of motion.   Neurological:      Mental Status: She is alert.      Motor: Motor strength is normal.     Deep Tendon Reflexes:      Reflex Scores:       Patellar reflexes are 2+ on the right side and 2+ on the left side.       Achilles reflexes are 2+ on the right side and 2+ on the left side.       Neurological Exam  Mental Status  Alert.    Motor   Strength is 5/5 throughout all four extremities.    Sensory  Sensation is intact to light touch, pinprick, vibration and proprioception in all four extremities.    Reflexes                                            Right                      Left  Patellar                                2+                         2+  Achilles                                2+                         2+    Gait  Casual gait is normal including stance, stride, and arm " swing.      Physical Exam         Result Review :       Data reviewed : Radiologic studies MRI Lumbar Spine on 10/25/24 at [facility] personally reviewed and interpreted. Worsening edema/Modic type I endplate changes at L1/L2. Multilevel spondylosis resulting in multilevel spinal canal and foraminal stensosis.           Assessment and Plan    Diagnoses and all orders for this visit:    1. Spondylosis of lumbosacral region without myelopathy or radiculopathy (Primary)  -     Ambulatory Referral to Pain Management        Assessment & Plan  1. Low back pain.  The recent MRI shows advanced degenerative changes at multiple levels within the lumbar spine, including disk degeneration and bone-on-bone contact. There are inflammatory changes on the vertebrae, mild spinal stenosis, and foraminal stenosis, but none are high grade. The pain persists despite previous SI joint and facet injections, which provided only temporary relief. She is currently taking Celebrex and Tylenol for arthritis pain but has not used Flexeril recently.     We discussed the importance of core strengthening, avoidance of activities that worsen the pain, nicotine cessation and maintenance of healthy weight. Surgery for patients with multilevel degenerative disc disease is not typically successful with the risks outweighing the benefit.       A spinal cord stimulator evaluation is recommended for potentially more sustained relief. A referral will be sent to the pain clinic for this evaluation, and they will contact her to schedule a follow-up appointment.           Follow Up   Return if symptoms worsen or fail to improve.  Patient was given instructions and counseling regarding her condition or for health maintenance advice.     Patient or patient representative verbalized consent for the use of Ambient Listening during the visit with  BELA Salcido for chart documentation. 10/29/2024  15:01 EDT

## 2025-04-30 ENCOUNTER — OFFICE VISIT (OUTPATIENT)
Dept: ORTHOPEDIC SURGERY | Facility: CLINIC | Age: 80
End: 2025-04-30
Payer: MEDICARE

## 2025-04-30 VITALS
SYSTOLIC BLOOD PRESSURE: 133 MMHG | WEIGHT: 155 LBS | OXYGEN SATURATION: 94 % | BODY MASS INDEX: 23.49 KG/M2 | DIASTOLIC BLOOD PRESSURE: 82 MMHG | HEIGHT: 68 IN | HEART RATE: 94 BPM

## 2025-04-30 DIAGNOSIS — Z96.642 HISTORY OF TOTAL HIP ARTHROPLASTY, LEFT: Primary | ICD-10-CM

## 2025-04-30 DIAGNOSIS — M25.552 LEFT HIP PAIN: ICD-10-CM

## 2025-04-30 RX ORDER — METHOCARBAMOL 500 MG/1
500 TABLET, FILM COATED ORAL 2 TIMES DAILY
COMMUNITY
Start: 2025-03-24

## 2025-04-30 RX ORDER — TRAMADOL HYDROCHLORIDE 75 MG/1
TABLET, COATED ORAL
COMMUNITY
Start: 2025-03-25

## (undated) DEVICE — STERILE POLYISOPRENE POWDER-FREE SURGICAL GLOVES: Brand: PROTEXIS

## (undated) DEVICE — SHLL ACET G7 PPS LTD/HL TI SZC 48MM
Type: IMPLANTABLE DEVICE | Site: HIP | Status: NON-FUNCTIONAL
Removed: 2022-05-06

## (undated) DEVICE — TOTAL ANTERIOR HIP-LF: Brand: MEDLINE INDUSTRIES, INC.

## (undated) DEVICE — BIPOLAR SEALER 23-112-1 AQM 6.0: Brand: AQUAMANTYS™

## (undated) DEVICE — MAT FLR ABS W/BLU/LINER 56X72IN WHT

## (undated) DEVICE — Device

## (undated) DEVICE — GLV SURG SENSICARE PI PF LF 7 GRN STRL

## (undated) DEVICE — GLV SURG SENSICARE SLT PF LF 7 STRL

## (undated) DEVICE — UNDYED BRAIDED (POLYGLACTIN 910), SYNTHETIC ABSORBABLE SUTURE: Brand: COATED VICRYL

## (undated) DEVICE — PROXIMATE RH ROTATING HEAD SKIN STAPLERS (35 WIDE) CONTAINS 35 STAINLESS STEEL STAPLES: Brand: PROXIMATE

## (undated) DEVICE — APPL CHLORAPREP HI/LITE 26ML ORNG

## (undated) DEVICE — GAUZE,SPONGE,4"X4",16PLY,STRL,LF,10/TRAY: Brand: MEDLINE

## (undated) DEVICE — DRSNG SURESITE WNDW 2.38X2.75

## (undated) DEVICE — TOWEL,OR,DSP,ST,BLUE,STD,4/PK,20PK/CS: Brand: MEDLINE

## (undated) DEVICE — LIGHT SLEEVE: Brand: DEVON

## (undated) DEVICE — SOL IRR NACL 0.9PCT BT 1000ML

## (undated) DEVICE — KT PT POSITION SUPINE HANA/PROFX TABL

## (undated) DEVICE — MEDI-VAC NON-CONDUCTIVE SUCTION TUBING: Brand: CARDINAL HEALTH

## (undated) DEVICE — PENCL E/S SMOKEEVAC W/TELESCP CANN

## (undated) DEVICE — SUT POLY FORCEFIBER W/CUT/NDL NO5 38IN

## (undated) DEVICE — STRYKER PERFORMANCE SERIES SAGITTAL BLADE: Brand: STRYKER PERFORMANCE SERIES

## (undated) DEVICE — 450 ML BOTTLE OF 0.05% CHLORHEXIDINE GLUCONATE IN 99.95% STERILE WATER FOR IRRIGATION, USP AND APPLICATOR.: Brand: IRRISEPT ANTIMICROBIAL WOUND LAVAGE

## (undated) DEVICE — SUT VIC UD BR COAT 0 CP2 27IN

## (undated) DEVICE — SLV SCD KN/LEN ADJ EXPRSS BLENDED MD 1P/U

## (undated) DEVICE — PULLOVER TOGA, 2X LARGE: Brand: FLYTE, SURGICOOL

## (undated) DEVICE — ELECTRD BLD EXT EDGE 1P COAT 6.5IN STRL

## (undated) DEVICE — CVR LEG BOOTLEG F/R NOSKID 33IN

## (undated) DEVICE — ZIPPERED TOGA, PEEL-AWAY 2X LARGE: Brand: FLYTE, SURGICOOL

## (undated) DEVICE — ENCORE® LATEX ORTHO SIZE 8, STERILE LATEX POWDER-FREE SURGICAL GLOVE: Brand: ENCORE